# Patient Record
Sex: FEMALE | Race: WHITE | Employment: FULL TIME | URBAN - METROPOLITAN AREA
[De-identification: names, ages, dates, MRNs, and addresses within clinical notes are randomized per-mention and may not be internally consistent; named-entity substitution may affect disease eponyms.]

---

## 2020-12-01 ENCOUNTER — APPOINTMENT (OUTPATIENT)
Dept: GENERAL RADIOLOGY | Age: 25
DRG: 314 | End: 2020-12-01
Payer: COMMERCIAL

## 2020-12-01 ENCOUNTER — HOSPITAL ENCOUNTER (INPATIENT)
Age: 25
LOS: 5 days | Discharge: HOME | DRG: 314 | End: 2020-12-07
Attending: STUDENT IN AN ORGANIZED HEALTH CARE EDUCATION/TRAINING PROGRAM | Admitting: STUDENT IN AN ORGANIZED HEALTH CARE EDUCATION/TRAINING PROGRAM
Payer: COMMERCIAL

## 2020-12-01 ENCOUNTER — APPOINTMENT (OUTPATIENT)
Dept: CT IMAGING | Age: 25
DRG: 314 | End: 2020-12-01
Payer: COMMERCIAL

## 2020-12-01 PROBLEM — S82.852A CLOSED TRIMALLEOLAR FRACTURE OF LEFT ANKLE: Status: ACTIVE | Noted: 2020-12-01
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
## 2020-12-01 LAB
ALBUMIN SERPL-MCNC: 4.6 GM/DL (ref 3.4–5)
ALP BLD-CCNC: 53 IU/L (ref 40–128)
ALT SERPL-CCNC: 12 U/L (ref 10–40)
ANION GAP SERPL CALCULATED.3IONS-SCNC: 17 MMOL/L (ref 4–16)
APTT: 28.4 SECONDS (ref 25.1–37.1)
AST SERPL-CCNC: 15 IU/L (ref 15–37)
BASOPHILS ABSOLUTE: 0 K/CU MM
BASOPHILS RELATIVE PERCENT: 0.3 % (ref 0–1)
BILIRUB SERPL-MCNC: 0.2 MG/DL (ref 0–1)
BUN BLDV-MCNC: 7 MG/DL (ref 6–23)
CALCIUM SERPL-MCNC: 9.3 MG/DL (ref 8.3–10.6)
CHLORIDE BLD-SCNC: 104 MMOL/L (ref 99–110)
CO2: 18 MMOL/L (ref 21–32)
CREAT SERPL-MCNC: 0.6 MG/DL (ref 0.6–1.1)
DIFFERENTIAL TYPE: ABNORMAL
EOSINOPHILS ABSOLUTE: 0.2 K/CU MM
EOSINOPHILS RELATIVE PERCENT: 1.2 % (ref 0–3)
GFR AFRICAN AMERICAN: >60 ML/MIN/1.73M2
GFR NON-AFRICAN AMERICAN: >60 ML/MIN/1.73M2
GLUCOSE BLD-MCNC: 88 MG/DL (ref 70–99)
HCG QUALITATIVE: NEGATIVE
HCT VFR BLD CALC: 43.4 % (ref 37–47)
HEMOGLOBIN: 14.4 GM/DL (ref 12.5–16)
IMMATURE NEUTROPHIL %: 0.3 % (ref 0–0.43)
INR BLD: 0.99 INDEX
LYMPHOCYTES ABSOLUTE: 2.1 K/CU MM
LYMPHOCYTES RELATIVE PERCENT: 16.6 % (ref 24–44)
MCH RBC QN AUTO: 31.2 PG (ref 27–31)
MCHC RBC AUTO-ENTMCNC: 33.2 % (ref 32–36)
MCV RBC AUTO: 94.1 FL (ref 78–100)
MONOCYTES ABSOLUTE: 0.6 K/CU MM
MONOCYTES RELATIVE PERCENT: 4.6 % (ref 0–4)
NUCLEATED RBC %: 0 %
PDW BLD-RTO: 12.2 % (ref 11.7–14.9)
PLATELET # BLD: 261 K/CU MM (ref 140–440)
PMV BLD AUTO: 10.5 FL (ref 7.5–11.1)
POTASSIUM SERPL-SCNC: 4.2 MMOL/L (ref 3.5–5.1)
PROTHROMBIN TIME: 12 SECONDS (ref 11.7–14.5)
RBC # BLD: 4.61 M/CU MM (ref 4.2–5.4)
SARS-COV-2, NAAT: NOT DETECTED
SEGMENTED NEUTROPHILS ABSOLUTE COUNT: 9.7 K/CU MM
SEGMENTED NEUTROPHILS RELATIVE PERCENT: 77 % (ref 36–66)
SODIUM BLD-SCNC: 139 MMOL/L (ref 135–145)
SOURCE: NORMAL
TOTAL IMMATURE NEUTOROPHIL: 0.04 K/CU MM
TOTAL NUCLEATED RBC: 0 K/CU MM
TOTAL PROTEIN: 7.4 GM/DL (ref 6.4–8.2)
WBC # BLD: 12.6 K/CU MM (ref 4–10.5)

## 2020-12-01 PROCEDURE — 80053 COMPREHEN METABOLIC PANEL: CPT

## 2020-12-01 PROCEDURE — 73700 CT LOWER EXTREMITY W/O DYE: CPT

## 2020-12-01 PROCEDURE — 84703 CHORIONIC GONADOTROPIN ASSAY: CPT

## 2020-12-01 PROCEDURE — 85025 COMPLETE CBC W/AUTO DIFF WBC: CPT

## 2020-12-01 PROCEDURE — 85610 PROTHROMBIN TIME: CPT

## 2020-12-01 PROCEDURE — 99285 EMERGENCY DEPT VISIT HI MDM: CPT

## 2020-12-01 PROCEDURE — 29515 APPLICATION SHORT LEG SPLINT: CPT

## 2020-12-01 PROCEDURE — 6370000000 HC RX 637 (ALT 250 FOR IP): Performed by: PHYSICIAN ASSISTANT

## 2020-12-01 PROCEDURE — U0002 COVID-19 LAB TEST NON-CDC: HCPCS

## 2020-12-01 PROCEDURE — 73610 X-RAY EXAM OF ANKLE: CPT

## 2020-12-01 PROCEDURE — 99285 EMERGENCY DEPT VISIT HI MDM: CPT | Performed by: ORTHOPAEDIC SURGERY

## 2020-12-01 PROCEDURE — 6360000002 HC RX W HCPCS: Performed by: PHYSICIAN ASSISTANT

## 2020-12-01 PROCEDURE — 85730 THROMBOPLASTIN TIME PARTIAL: CPT

## 2020-12-01 RX ORDER — HEPARIN SODIUM 5000 [USP'U]/ML
5000 INJECTION, SOLUTION INTRAVENOUS; SUBCUTANEOUS EVERY 8 HOURS SCHEDULED
Status: DISCONTINUED | OUTPATIENT
Start: 2020-12-01 | End: 2020-12-02

## 2020-12-01 RX ORDER — SODIUM CHLORIDE 9 MG/ML
INJECTION, SOLUTION INTRAVENOUS CONTINUOUS
Status: DISCONTINUED | OUTPATIENT
Start: 2020-12-01 | End: 2020-12-05

## 2020-12-01 RX ORDER — NICOTINE 21 MG/24HR
1 PATCH, TRANSDERMAL 24 HOURS TRANSDERMAL DAILY
Status: CANCELLED | OUTPATIENT
Start: 2020-12-02

## 2020-12-01 RX ORDER — POLYETHYLENE GLYCOL 3350 17 G/17G
17 POWDER, FOR SOLUTION ORAL DAILY PRN
Status: DISCONTINUED | OUTPATIENT
Start: 2020-12-01 | End: 2020-12-07 | Stop reason: HOSPADM

## 2020-12-01 RX ORDER — HYDROXYZINE PAMOATE 25 MG/1
50 CAPSULE ORAL ONCE
Status: COMPLETED | OUTPATIENT
Start: 2020-12-01 | End: 2020-12-01

## 2020-12-01 RX ORDER — OXYCODONE HYDROCHLORIDE AND ACETAMINOPHEN 5; 325 MG/1; MG/1
1 TABLET ORAL EVERY 4 HOURS PRN
Status: DISCONTINUED | OUTPATIENT
Start: 2020-12-01 | End: 2020-12-07 | Stop reason: HOSPADM

## 2020-12-01 RX ORDER — MORPHINE SULFATE 4 MG/ML
4 INJECTION, SOLUTION INTRAMUSCULAR; INTRAVENOUS ONCE
Status: COMPLETED | OUTPATIENT
Start: 2020-12-01 | End: 2020-12-01

## 2020-12-01 RX ORDER — HYDROCODONE BITARTRATE AND ACETAMINOPHEN 5; 325 MG/1; MG/1
1 TABLET ORAL ONCE
Status: COMPLETED | OUTPATIENT
Start: 2020-12-01 | End: 2020-12-01

## 2020-12-01 RX ORDER — ONDANSETRON 2 MG/ML
4 INJECTION INTRAMUSCULAR; INTRAVENOUS ONCE
Status: COMPLETED | OUTPATIENT
Start: 2020-12-01 | End: 2020-12-01

## 2020-12-01 RX ORDER — ACETAMINOPHEN 325 MG/1
650 TABLET ORAL EVERY 6 HOURS PRN
Status: DISCONTINUED | OUTPATIENT
Start: 2020-12-01 | End: 2020-12-07 | Stop reason: HOSPADM

## 2020-12-01 RX ORDER — PROMETHAZINE HYDROCHLORIDE 25 MG/1
12.5 TABLET ORAL EVERY 6 HOURS PRN
Status: DISCONTINUED | OUTPATIENT
Start: 2020-12-01 | End: 2020-12-07 | Stop reason: HOSPADM

## 2020-12-01 RX ORDER — ACETAMINOPHEN 650 MG/1
650 SUPPOSITORY RECTAL EVERY 6 HOURS PRN
Status: DISCONTINUED | OUTPATIENT
Start: 2020-12-01 | End: 2020-12-07 | Stop reason: HOSPADM

## 2020-12-01 RX ORDER — ONDANSETRON 2 MG/ML
4 INJECTION INTRAMUSCULAR; INTRAVENOUS EVERY 6 HOURS PRN
Status: DISCONTINUED | OUTPATIENT
Start: 2020-12-01 | End: 2020-12-07 | Stop reason: HOSPADM

## 2020-12-01 RX ORDER — KETOROLAC TROMETHAMINE 30 MG/ML
30 INJECTION, SOLUTION INTRAMUSCULAR; INTRAVENOUS EVERY 6 HOURS PRN
Status: DISPENSED | OUTPATIENT
Start: 2020-12-01 | End: 2020-12-06

## 2020-12-01 RX ADMIN — HYDROXYZINE PAMOATE 50 MG: 25 CAPSULE ORAL at 22:16

## 2020-12-01 RX ADMIN — SODIUM CHLORIDE: 9 INJECTION, SOLUTION INTRAVENOUS at 23:07

## 2020-12-01 RX ADMIN — MORPHINE SULFATE 4 MG: 4 INJECTION, SOLUTION INTRAMUSCULAR; INTRAVENOUS at 22:17

## 2020-12-01 RX ADMIN — HYDROCODONE BITARTRATE AND ACETAMINOPHEN 1 TABLET: 5; 325 TABLET ORAL at 19:50

## 2020-12-01 RX ADMIN — ONDANSETRON 4 MG: 2 INJECTION INTRAMUSCULAR; INTRAVENOUS at 22:17

## 2020-12-01 ASSESSMENT — PAIN SCALES - GENERAL
PAINLEVEL_OUTOF10: 10

## 2020-12-01 ASSESSMENT — PAIN DESCRIPTION - ORIENTATION: ORIENTATION: LEFT

## 2020-12-01 ASSESSMENT — PAIN DESCRIPTION - LOCATION: LOCATION: ANKLE

## 2020-12-01 ASSESSMENT — PAIN DESCRIPTION - PAIN TYPE: TYPE: ACUTE PAIN

## 2020-12-02 ENCOUNTER — APPOINTMENT (OUTPATIENT)
Dept: GENERAL RADIOLOGY | Age: 25
DRG: 314 | End: 2020-12-02
Payer: COMMERCIAL

## 2020-12-02 ENCOUNTER — ANESTHESIA EVENT (OUTPATIENT)
Dept: OPERATING ROOM | Age: 25
DRG: 314 | End: 2020-12-02
Payer: COMMERCIAL

## 2020-12-02 ENCOUNTER — ANESTHESIA (OUTPATIENT)
Dept: OPERATING ROOM | Age: 25
DRG: 314 | End: 2020-12-02
Payer: COMMERCIAL

## 2020-12-02 VITALS
TEMPERATURE: 97.7 F | RESPIRATION RATE: 1 BRPM | SYSTOLIC BLOOD PRESSURE: 115 MMHG | DIASTOLIC BLOOD PRESSURE: 57 MMHG | OXYGEN SATURATION: 97 %

## 2020-12-02 LAB
AMPHETAMINES: NEGATIVE
ANION GAP SERPL CALCULATED.3IONS-SCNC: 10 MMOL/L (ref 4–16)
BARBITURATE SCREEN URINE: NEGATIVE
BASOPHILS ABSOLUTE: 0 K/CU MM
BASOPHILS RELATIVE PERCENT: 0.4 % (ref 0–1)
BENZODIAZEPINE SCREEN, URINE: NEGATIVE
BUN BLDV-MCNC: 8 MG/DL (ref 6–23)
CALCIUM SERPL-MCNC: 8.6 MG/DL (ref 8.3–10.6)
CANNABINOID SCREEN URINE: ABNORMAL
CHLORIDE BLD-SCNC: 101 MMOL/L (ref 99–110)
CO2: 23 MMOL/L (ref 21–32)
COCAINE METABOLITE: NEGATIVE
CREAT SERPL-MCNC: 0.8 MG/DL (ref 0.6–1.1)
DIFFERENTIAL TYPE: ABNORMAL
EOSINOPHILS ABSOLUTE: 0.1 K/CU MM
EOSINOPHILS RELATIVE PERCENT: 1 % (ref 0–3)
GFR AFRICAN AMERICAN: >60 ML/MIN/1.73M2
GFR NON-AFRICAN AMERICAN: >60 ML/MIN/1.73M2
GLUCOSE BLD-MCNC: 98 MG/DL (ref 70–99)
HCT VFR BLD CALC: 39.1 % (ref 37–47)
HEMOGLOBIN: 12.7 GM/DL (ref 12.5–16)
IMMATURE NEUTROPHIL %: 0.3 % (ref 0–0.43)
LYMPHOCYTES ABSOLUTE: 2.5 K/CU MM
LYMPHOCYTES RELATIVE PERCENT: 23.9 % (ref 24–44)
MCH RBC QN AUTO: 31.6 PG (ref 27–31)
MCHC RBC AUTO-ENTMCNC: 32.5 % (ref 32–36)
MCV RBC AUTO: 97.3 FL (ref 78–100)
MONOCYTES ABSOLUTE: 0.9 K/CU MM
MONOCYTES RELATIVE PERCENT: 8.4 % (ref 0–4)
NUCLEATED RBC %: 0 %
OPIATES, URINE: ABNORMAL
OXYCODONE: ABNORMAL
PDW BLD-RTO: 12.5 % (ref 11.7–14.9)
PHENCYCLIDINE, URINE: NEGATIVE
PLATELET # BLD: 237 K/CU MM (ref 140–440)
PMV BLD AUTO: 10.9 FL (ref 7.5–11.1)
POTASSIUM SERPL-SCNC: 4 MMOL/L (ref 3.5–5.1)
RBC # BLD: 4.02 M/CU MM (ref 4.2–5.4)
SEGMENTED NEUTROPHILS ABSOLUTE COUNT: 6.9 K/CU MM
SEGMENTED NEUTROPHILS RELATIVE PERCENT: 66 % (ref 36–66)
SODIUM BLD-SCNC: 134 MMOL/L (ref 135–145)
TOTAL IMMATURE NEUTOROPHIL: 0.03 K/CU MM
TOTAL NUCLEATED RBC: 0 K/CU MM
WBC # BLD: 10.4 K/CU MM (ref 4–10.5)

## 2020-12-02 PROCEDURE — 85025 COMPLETE CBC W/AUTO DIFF WBC: CPT

## 2020-12-02 PROCEDURE — 6370000000 HC RX 637 (ALT 250 FOR IP): Performed by: NURSE PRACTITIONER

## 2020-12-02 PROCEDURE — 3700000001 HC ADD 15 MINUTES (ANESTHESIA): Performed by: ORTHOPAEDIC SURGERY

## 2020-12-02 PROCEDURE — 27818 TREATMENT OF ANKLE FRACTURE: CPT | Performed by: ORTHOPAEDIC SURGERY

## 2020-12-02 PROCEDURE — 3600000004 HC SURGERY LEVEL 4 BASE: Performed by: ORTHOPAEDIC SURGERY

## 2020-12-02 PROCEDURE — 3600000014 HC SURGERY LEVEL 4 ADDTL 15MIN: Performed by: ORTHOPAEDIC SURGERY

## 2020-12-02 PROCEDURE — 80307 DRUG TEST PRSMV CHEM ANLYZR: CPT

## 2020-12-02 PROCEDURE — 2580000003 HC RX 258: Performed by: NURSE ANESTHETIST, CERTIFIED REGISTERED

## 2020-12-02 PROCEDURE — 7100000000 HC PACU RECOVERY - FIRST 15 MIN: Performed by: ORTHOPAEDIC SURGERY

## 2020-12-02 PROCEDURE — 2720000010 HC SURG SUPPLY STERILE: Performed by: ORTHOPAEDIC SURGERY

## 2020-12-02 PROCEDURE — 1200000000 HC SEMI PRIVATE

## 2020-12-02 PROCEDURE — 6360000002 HC RX W HCPCS: Performed by: PHYSICIAN ASSISTANT

## 2020-12-02 PROCEDURE — 2500000003 HC RX 250 WO HCPCS: Performed by: NURSE ANESTHETIST, CERTIFIED REGISTERED

## 2020-12-02 PROCEDURE — 6360000002 HC RX W HCPCS: Performed by: NURSE PRACTITIONER

## 2020-12-02 PROCEDURE — 80048 BASIC METABOLIC PNL TOTAL CA: CPT

## 2020-12-02 PROCEDURE — 2580000003 HC RX 258: Performed by: PHYSICIAN ASSISTANT

## 2020-12-02 PROCEDURE — 64445 NJX AA&/STRD SCIATIC NRV IMG: CPT | Performed by: NURSE ANESTHETIST, CERTIFIED REGISTERED

## 2020-12-02 PROCEDURE — 76000 FLUOROSCOPY <1 HR PHYS/QHP: CPT

## 2020-12-02 PROCEDURE — 6360000002 HC RX W HCPCS: Performed by: NURSE ANESTHETIST, CERTIFIED REGISTERED

## 2020-12-02 PROCEDURE — 2780000010 HC IMPLANT OTHER: Performed by: ORTHOPAEDIC SURGERY

## 2020-12-02 PROCEDURE — 7100000001 HC PACU RECOVERY - ADDTL 15 MIN: Performed by: ORTHOPAEDIC SURGERY

## 2020-12-02 PROCEDURE — 20690 APPL UNIPLN UNI EXT FIXJ SYS: CPT | Performed by: ORTHOPAEDIC SURGERY

## 2020-12-02 PROCEDURE — C1713 ANCHOR/SCREW BN/BN,TIS/BN: HCPCS | Performed by: ORTHOPAEDIC SURGERY

## 2020-12-02 PROCEDURE — 2580000003 HC RX 258: Performed by: NURSE PRACTITIONER

## 2020-12-02 PROCEDURE — 6360000002 HC RX W HCPCS: Performed by: ANESTHESIOLOGY

## 2020-12-02 PROCEDURE — 2709999900 HC NON-CHARGEABLE SUPPLY: Performed by: ORTHOPAEDIC SURGERY

## 2020-12-02 PROCEDURE — 2580000003 HC RX 258: Performed by: ORTHOPAEDIC SURGERY

## 2020-12-02 PROCEDURE — 3700000000 HC ANESTHESIA ATTENDED CARE: Performed by: ORTHOPAEDIC SURGERY

## 2020-12-02 PROCEDURE — C9113 INJ PANTOPRAZOLE SODIUM, VIA: HCPCS | Performed by: NURSE PRACTITIONER

## 2020-12-02 PROCEDURE — 6360000002 HC RX W HCPCS: Performed by: ORTHOPAEDIC SURGERY

## 2020-12-02 DEVICE — PIN EXT FIX L250MM DIA5MM S STL W/ CTRL THRD STNMN: Type: IMPLANTABLE DEVICE | Site: ANKLE | Status: FUNCTIONAL

## 2020-12-02 RX ORDER — ROPIVACAINE HYDROCHLORIDE 5 MG/ML
INJECTION, SOLUTION EPIDURAL; INFILTRATION; PERINEURAL
Status: COMPLETED | OUTPATIENT
Start: 2020-12-02 | End: 2020-12-02

## 2020-12-02 RX ORDER — LIDOCAINE HYDROCHLORIDE 20 MG/ML
INJECTION, SOLUTION INTRAVENOUS PRN
Status: DISCONTINUED | OUTPATIENT
Start: 2020-12-02 | End: 2020-12-02 | Stop reason: SDUPTHER

## 2020-12-02 RX ORDER — GLYCOPYRROLATE 1 MG/5 ML
SYRINGE (ML) INTRAVENOUS PRN
Status: DISCONTINUED | OUTPATIENT
Start: 2020-12-02 | End: 2020-12-02 | Stop reason: SDUPTHER

## 2020-12-02 RX ORDER — NEOSTIGMINE METHYLSULFATE 5 MG/5 ML
SYRINGE (ML) INTRAVENOUS PRN
Status: DISCONTINUED | OUTPATIENT
Start: 2020-12-02 | End: 2020-12-02 | Stop reason: SDUPTHER

## 2020-12-02 RX ORDER — DEXAMETHASONE SODIUM PHOSPHATE 4 MG/ML
INJECTION, SOLUTION INTRA-ARTICULAR; INTRALESIONAL; INTRAMUSCULAR; INTRAVENOUS; SOFT TISSUE PRN
Status: DISCONTINUED | OUTPATIENT
Start: 2020-12-02 | End: 2020-12-02 | Stop reason: SDUPTHER

## 2020-12-02 RX ORDER — SUCCINYLCHOLINE/SOD CL,ISO/PF 100 MG/5ML
SYRINGE (ML) INTRAVENOUS PRN
Status: DISCONTINUED | OUTPATIENT
Start: 2020-12-02 | End: 2020-12-02 | Stop reason: SDUPTHER

## 2020-12-02 RX ORDER — PROPOFOL 10 MG/ML
INJECTION, EMULSION INTRAVENOUS PRN
Status: DISCONTINUED | OUTPATIENT
Start: 2020-12-02 | End: 2020-12-02 | Stop reason: SDUPTHER

## 2020-12-02 RX ORDER — SODIUM CHLORIDE, SODIUM LACTATE, POTASSIUM CHLORIDE, CALCIUM CHLORIDE 600; 310; 30; 20 MG/100ML; MG/100ML; MG/100ML; MG/100ML
INJECTION, SOLUTION INTRAVENOUS CONTINUOUS PRN
Status: DISCONTINUED | OUTPATIENT
Start: 2020-12-02 | End: 2020-12-02 | Stop reason: SDUPTHER

## 2020-12-02 RX ORDER — FENTANYL CITRATE 50 UG/ML
25 INJECTION, SOLUTION INTRAMUSCULAR; INTRAVENOUS EVERY 5 MIN PRN
Status: DISCONTINUED | OUTPATIENT
Start: 2020-12-02 | End: 2020-12-02 | Stop reason: HOSPADM

## 2020-12-02 RX ORDER — HYDROMORPHONE HCL 110MG/55ML
PATIENT CONTROLLED ANALGESIA SYRINGE INTRAVENOUS PRN
Status: DISCONTINUED | OUTPATIENT
Start: 2020-12-02 | End: 2020-12-02 | Stop reason: SDUPTHER

## 2020-12-02 RX ORDER — ONDANSETRON 2 MG/ML
INJECTION INTRAMUSCULAR; INTRAVENOUS PRN
Status: DISCONTINUED | OUTPATIENT
Start: 2020-12-02 | End: 2020-12-02 | Stop reason: SDUPTHER

## 2020-12-02 RX ORDER — SODIUM CHLORIDE 0.9 % (FLUSH) 0.9 %
10 SYRINGE (ML) INJECTION EVERY 12 HOURS SCHEDULED
Status: DISCONTINUED | OUTPATIENT
Start: 2020-12-02 | End: 2020-12-07 | Stop reason: HOSPADM

## 2020-12-02 RX ORDER — NICOTINE 21 MG/24HR
1 PATCH, TRANSDERMAL 24 HOURS TRANSDERMAL DAILY
Status: DISCONTINUED | OUTPATIENT
Start: 2020-12-02 | End: 2020-12-07 | Stop reason: HOSPADM

## 2020-12-02 RX ORDER — SODIUM CHLORIDE 0.9 % (FLUSH) 0.9 %
10 SYRINGE (ML) INJECTION PRN
Status: DISCONTINUED | OUTPATIENT
Start: 2020-12-02 | End: 2020-12-07 | Stop reason: HOSPADM

## 2020-12-02 RX ORDER — MIDAZOLAM HYDROCHLORIDE 1 MG/ML
INJECTION INTRAMUSCULAR; INTRAVENOUS PRN
Status: DISCONTINUED | OUTPATIENT
Start: 2020-12-02 | End: 2020-12-02 | Stop reason: SDUPTHER

## 2020-12-02 RX ORDER — ONDANSETRON 2 MG/ML
4 INJECTION INTRAMUSCULAR; INTRAVENOUS
Status: DISCONTINUED | OUTPATIENT
Start: 2020-12-02 | End: 2020-12-02 | Stop reason: HOSPADM

## 2020-12-02 RX ORDER — HYDRALAZINE HYDROCHLORIDE 20 MG/ML
5 INJECTION INTRAMUSCULAR; INTRAVENOUS EVERY 10 MIN PRN
Status: DISCONTINUED | OUTPATIENT
Start: 2020-12-02 | End: 2020-12-02 | Stop reason: HOSPADM

## 2020-12-02 RX ORDER — ROCURONIUM BROMIDE 10 MG/ML
INJECTION, SOLUTION INTRAVENOUS PRN
Status: DISCONTINUED | OUTPATIENT
Start: 2020-12-02 | End: 2020-12-02 | Stop reason: SDUPTHER

## 2020-12-02 RX ORDER — FENTANYL CITRATE 50 UG/ML
50 INJECTION, SOLUTION INTRAMUSCULAR; INTRAVENOUS EVERY 5 MIN PRN
Status: DISCONTINUED | OUTPATIENT
Start: 2020-12-02 | End: 2020-12-02 | Stop reason: HOSPADM

## 2020-12-02 RX ORDER — LABETALOL HYDROCHLORIDE 5 MG/ML
5 INJECTION, SOLUTION INTRAVENOUS EVERY 10 MIN PRN
Status: DISCONTINUED | OUTPATIENT
Start: 2020-12-02 | End: 2020-12-02 | Stop reason: HOSPADM

## 2020-12-02 RX ORDER — PANTOPRAZOLE SODIUM 40 MG/10ML
40 INJECTION, POWDER, LYOPHILIZED, FOR SOLUTION INTRAVENOUS DAILY
Status: DISCONTINUED | OUTPATIENT
Start: 2020-12-02 | End: 2020-12-07 | Stop reason: HOSPADM

## 2020-12-02 RX ORDER — FENTANYL CITRATE 50 UG/ML
INJECTION, SOLUTION INTRAMUSCULAR; INTRAVENOUS PRN
Status: DISCONTINUED | OUTPATIENT
Start: 2020-12-02 | End: 2020-12-02 | Stop reason: SDUPTHER

## 2020-12-02 RX ADMIN — HYDROMORPHONE HYDROCHLORIDE 0.5 MG: 2 INJECTION INTRAMUSCULAR; INTRAVENOUS; SUBCUTANEOUS at 09:13

## 2020-12-02 RX ADMIN — HYDROMORPHONE HYDROCHLORIDE 0.5 MG: 2 INJECTION INTRAMUSCULAR; INTRAVENOUS; SUBCUTANEOUS at 08:53

## 2020-12-02 RX ADMIN — OXYCODONE HYDROCHLORIDE AND ACETAMINOPHEN 1 TABLET: 5; 325 TABLET ORAL at 21:28

## 2020-12-02 RX ADMIN — PANTOPRAZOLE SODIUM 40 MG: 40 INJECTION, POWDER, LYOPHILIZED, FOR SOLUTION INTRAVENOUS at 10:30

## 2020-12-02 RX ADMIN — ROPIVACAINE HYDROCHLORIDE 30 ML: 5 INJECTION, SOLUTION EPIDURAL; INFILTRATION; PERINEURAL at 08:44

## 2020-12-02 RX ADMIN — FENTANYL CITRATE 100 MCG: 50 INJECTION INTRAMUSCULAR; INTRAVENOUS at 07:58

## 2020-12-02 RX ADMIN — Medication 3 MG: at 09:43

## 2020-12-02 RX ADMIN — OXYCODONE HYDROCHLORIDE AND ACETAMINOPHEN 1 TABLET: 5; 325 TABLET ORAL at 11:31

## 2020-12-02 RX ADMIN — OXYCODONE HYDROCHLORIDE AND ACETAMINOPHEN 1 TABLET: 5; 325 TABLET ORAL at 16:40

## 2020-12-02 RX ADMIN — OXYCODONE HYDROCHLORIDE AND ACETAMINOPHEN 1 TABLET: 5; 325 TABLET ORAL at 04:58

## 2020-12-02 RX ADMIN — Medication 140 MG: at 08:45

## 2020-12-02 RX ADMIN — PROPOFOL 200 MG: 10 INJECTION, EMULSION INTRAVENOUS at 08:45

## 2020-12-02 RX ADMIN — CEFAZOLIN SODIUM 2 G: 10 INJECTION, POWDER, FOR SOLUTION INTRAVENOUS at 15:17

## 2020-12-02 RX ADMIN — DEXAMETHASONE SODIUM PHOSPHATE 8 MG: 4 INJECTION, SOLUTION INTRAMUSCULAR; INTRAVENOUS at 08:50

## 2020-12-02 RX ADMIN — ONDANSETRON 4 MG: 2 INJECTION INTRAMUSCULAR; INTRAVENOUS at 08:50

## 2020-12-02 RX ADMIN — OXYCODONE HYDROCHLORIDE AND ACETAMINOPHEN 1 TABLET: 5; 325 TABLET ORAL at 00:21

## 2020-12-02 RX ADMIN — SODIUM CHLORIDE, PRESERVATIVE FREE 10 ML: 5 INJECTION INTRAVENOUS at 20:13

## 2020-12-02 RX ADMIN — CEFAZOLIN SODIUM 2 G: 10 INJECTION, POWDER, FOR SOLUTION INTRAVENOUS at 23:07

## 2020-12-02 RX ADMIN — ROCURONIUM BROMIDE 50 MG: 10 INJECTION INTRAVENOUS at 08:50

## 2020-12-02 RX ADMIN — LIDOCAINE HYDROCHLORIDE 100 MG: 20 INJECTION, SOLUTION INTRAVENOUS at 08:45

## 2020-12-02 RX ADMIN — SODIUM CHLORIDE, POTASSIUM CHLORIDE, SODIUM LACTATE AND CALCIUM CHLORIDE: 600; 310; 30; 20 INJECTION, SOLUTION INTRAVENOUS at 08:39

## 2020-12-02 RX ADMIN — SODIUM CHLORIDE: 9 INJECTION, SOLUTION INTRAVENOUS at 11:19

## 2020-12-02 RX ADMIN — CEFAZOLIN 1 G: 1 INJECTION, POWDER, FOR SOLUTION INTRAMUSCULAR; INTRAVENOUS; PARENTERAL at 08:50

## 2020-12-02 RX ADMIN — SODIUM CHLORIDE, PRESERVATIVE FREE 10 ML: 5 INJECTION INTRAVENOUS at 15:17

## 2020-12-02 RX ADMIN — MIDAZOLAM 2 MG: 1 INJECTION INTRAMUSCULAR; INTRAVENOUS at 08:30

## 2020-12-02 RX ADMIN — Medication 0.4 MG: at 09:43

## 2020-12-02 ASSESSMENT — PULMONARY FUNCTION TESTS
PIF_VALUE: 19
PIF_VALUE: 18
PIF_VALUE: 2
PIF_VALUE: 19
PIF_VALUE: 18
PIF_VALUE: 19
PIF_VALUE: 17
PIF_VALUE: 18
PIF_VALUE: 20
PIF_VALUE: 18
PIF_VALUE: 20
PIF_VALUE: 0
PIF_VALUE: 18
PIF_VALUE: 17
PIF_VALUE: 19
PIF_VALUE: 18
PIF_VALUE: 26
PIF_VALUE: 17
PIF_VALUE: 19
PIF_VALUE: 18
PIF_VALUE: 20
PIF_VALUE: 17
PIF_VALUE: 17
PIF_VALUE: 2
PIF_VALUE: 20
PIF_VALUE: 18
PIF_VALUE: 0
PIF_VALUE: 2
PIF_VALUE: 18
PIF_VALUE: 34
PIF_VALUE: 20
PIF_VALUE: 20
PIF_VALUE: 11
PIF_VALUE: 19
PIF_VALUE: 2
PIF_VALUE: 18
PIF_VALUE: 10
PIF_VALUE: 19
PIF_VALUE: 20
PIF_VALUE: 4
PIF_VALUE: 17
PIF_VALUE: 24
PIF_VALUE: 18
PIF_VALUE: 18
PIF_VALUE: 20
PIF_VALUE: 20
PIF_VALUE: 19
PIF_VALUE: 1
PIF_VALUE: 20
PIF_VALUE: 0
PIF_VALUE: 17
PIF_VALUE: 17
PIF_VALUE: 18
PIF_VALUE: 19
PIF_VALUE: 20
PIF_VALUE: 20
PIF_VALUE: 18
PIF_VALUE: 2
PIF_VALUE: 1
PIF_VALUE: 20

## 2020-12-02 ASSESSMENT — PAIN DESCRIPTION - ORIENTATION
ORIENTATION: LEFT
ORIENTATION: LEFT

## 2020-12-02 ASSESSMENT — PAIN SCALES - GENERAL
PAINLEVEL_OUTOF10: 5
PAINLEVEL_OUTOF10: 4
PAINLEVEL_OUTOF10: 0
PAINLEVEL_OUTOF10: 5
PAINLEVEL_OUTOF10: 4
PAINLEVEL_OUTOF10: 5
PAINLEVEL_OUTOF10: 10
PAINLEVEL_OUTOF10: 2
PAINLEVEL_OUTOF10: 10

## 2020-12-02 ASSESSMENT — PAIN DESCRIPTION - PAIN TYPE
TYPE: SURGICAL PAIN
TYPE: SURGICAL PAIN

## 2020-12-02 NOTE — PROGRESS NOTES
Hospitalist Progress Note      Name:  David Montiel /Age/Sex: 1995  (22 y.o. female)   MRN & CSN:  [id number] & [id number] Admission Date/Time: 2020  7:17 PM   Location:  [address] [place] [address]-A PCP: [facility]  Day: 2    Assessment and Plan:   David Montiel is a 22 y.o.  female  who presents with Closed trimalleolar fracture of left ankle    · Closed Left trimalleolar ankle fracture s/p closed reduction, external fixation 2020  -S/P  fall  -On IV ancef,followed by daily keflex  -NWB  -Pain control, neuro checks  -Orthopedic surgery,plan for staged surgery following resolution of soft tissue     Alcohol use - admits to drinking alcohol       -denied withdrawal symptoms, monitor for symptoms      Tobacco abuse - on Nicotine patch, tobacco cessation education.      Morbid obesity - current BMI 33.09       -Health maintenance: exercise, lifestyle modification, and reduced caloric intake            Chronic Illnesses:        -asthma - breathing stable on room air, no resp distress noted. -bipolar 1 disorder    Diet DIET GENERAL;   DVT Prophylaxis [] Lovenox, []  Heparin, [] SCDs, []No VTE prophylaxis, patient ambulating   GI Prophylaxis [] PPI, [] H2 Blocker, [] No GI prophylaxis, patient is receiving diet/Tube Feeds   Code Status Full Code   Disposition Patient requires continued admission due to    MDM [] Low, [] Moderate,[]  High  Patient's risk as above due to      History of Present Illness:     Pt S&E. No acute events, seen after surgery, doing good, has pain but improves with medications     10-14 point ROS reviewed negative, unless as noted above    Objective:        Intake/Output Summary (Last 24 hours) at 2020 1338  Last data filed at 2020 1040  Gross per 24 hour   Intake 600 ml   Output 50 ml   Net 550 ml      Vitals:   Vitals:    20 1105   BP: 126/60   Pulse: 73   Resp: 15   Temp: 97.4 °F (36.3 °C)   SpO2: 95% Physical Exam:   GEN Awake female, sitting upright in bed in no apparent distress. Appears given age. EYES Pupils are equally round. No scleral erythema, discharge, or conjunctivitis. HENT Mucous membranes are moist.   NECK No apparent thyromegaly or masses. RESP Clear to auscultation, no wheezes, rales or rhonchi. Symmetric chest movement while on room air. CARDIO/VASC S1/S2 auscultated. Regular rate without appreciable murmurs, rubs, or gallops. Peripheral pulses equal bilaterally and palpable. No peripheral edema. GI Abdomen is soft without significant tenderness, masses, or guarding. Bowel sounds are normoactive. Rectal exam deferred.  Luciano catheter is not present. HEME/LYMPH No petechiae or ecchymoses. MSK No gross joint deformities. Spontaneous movement of all extremities. Left ankle with fracture blisters, external fixator in place  SKIN Normal coloration, warm, dry. NEURO Cranial nerves appear grossly intact, normal speech, no lateralizing weakness. PSYCH Awake, alert, oriented x 4. Affect appropriate.     Medications:   Medications:    sodium chloride flush  10 mL Intravenous 2 times per day    pantoprazole  40 mg Intravenous Daily    nicotine  1 patch Transdermal Daily    [START ON 12/3/2020] enoxaparin  40 mg Subcutaneous Daily    ceFAZolin  2 g Intravenous Q8H      Infusions:    sodium chloride 75 mL/hr at 12/02/20 1119     PRN Meds: sodium chloride flush, 10 mL, PRN  acetaminophen, 650 mg, Q6H PRN    Or  acetaminophen, 650 mg, Q6H PRN  polyethylene glycol, 17 g, Daily PRN  promethazine, 12.5 mg, Q6H PRN    Or  ondansetron, 4 mg, Q6H PRN  oxyCODONE-acetaminophen, 1 tablet, Q4H PRN  ketorolac, 30 mg, Q6H PRN        Data    Recent Labs     12/01/20 2118 12/02/20  0542   WBC 12.6* 10.4   HGB 14.4 12.7   HCT 43.4 39.1    237      Recent Labs     12/01/20 2118 12/02/20  0542    134*   K 4.2 4.0    101   CO2 18* 23   BUN 7 8   CREATININE 0.6 0.8     Recent Labs 12/01/20 2118   AST 15   ALT 12   BILITOT 0.2   ALKPHOS 53     Recent Labs     12/01/20 2118   INR 0.99     No results for input(s): CKTOTAL, CKMB, CKMBINDEX, TROPONINI in the last 72 hours.         Electronically signed by Miles Serrano MD on 12/2/2020 at 1:38 PM

## 2020-12-02 NOTE — PROGRESS NOTES
1946: Arrived to PACU from OR. Monitors applied, alarms on. Report obtained from Sabra Gonzalez and Alan Hendrix CRNA. External fixator in place left ankle secured with 3 pins. Bruising and multiple blisters noted medial aspect left ankle. Able to wiggle toes and states left lower leg feels numb ( had preop block). 1030: Dozes, arouses easily to name. States that's the best sleep shes had in a very long time. 1040: Attempted to call mother but states is not accepting calls at this time. 1042: Transported to room 1120.  1055: Mother called in and update given. New telephone number for her placed in chart. Stated the number I had was her old number.

## 2020-12-02 NOTE — ED NOTES
Bed: ED-37  Expected date:   Expected time:   Means of arrival:   Comments:   1032 Tyrell Snowden RN  12/02/20 8224

## 2020-12-02 NOTE — H&P
History and Physical  [facility]      Name:  Rosezetta Cowden /Age/Sex: 1995  (22 y.o. female)   MRN & CSN:  [id number] & [id number] Admission Date/Time: 2020  7:17 PM   Location:  ED05/ED-05 PCP: Binu JIMENEZ [facility] Day: 1      Supervising Physician: Dr. Eden Perry     Chief Complaint: Ankle Injury (left)     Assessment and Plan:   Rosezetta Cowden is a 22 y.o. female who presents with Closed trimalleolar fracture of left ankle     Closed trimalleolar fracture of left ankle, r/t mechanical fall - as per xray   Fall at home, initial encounter - fell last night.  Leukocytosis (12.6) - no clear evidence of infection at this time, no fever.       -admit inpatient    -Orthopedic Surgery, Dr. Mary Bennett consulted in ED    -NPO after MN for possible ORIF left ankle tomorrow    -c/w MIVF    -bedrest w/ BSC    -applied ICE to relieve pain and swelling    -non weight bearing, elevate affected extremity    -Neurovascular checks    -pain control: prn percocet, Toradol    -FALL precautions    -PT/OT eval and treat    -check lab works in AM    -pending UDS, rapid COVID, CT left ankle     Alcohol use - admits to drinking alcohol, last intake was 2 beers last night.       -denied withdrawal symptoms       -consider CIWA protocol if become symptomatic for withdrawal     Tobacco abuse - on Nicotine patch, tobacco cessation education.  Morbid obesity - current BMI 33.09       -Health maintenance: exercise, lifestyle modification, and reduced caloric intake            Chronic Illnesses:        -asthma - breathing stable on room air, no resp distress noted. -bipolar 1 disorder     Current diagnosis and plan of management discussed with the patient at the time of admission in lay language who agree to the above plan and disposition of admission for further care. All concerns and questions addressed.       Patient assessment and plan in conjunction with supervising physician - Joann Russell     Diet  General, n.p.o. after MN   DVT Prophylaxis [] Lovenox, [x]  Heparin, [] SCDs, [] Ambulation  [] Long term AC   GI Prophylaxis [x] PPI,  [] H2 Blocker,  [] Carafate,  [] Diet,  [] No GI prophylaxis, N/A: patient is not under significant medical stress, non-ICU or is receiving a diet/tube feeds   Code Status  Full CODE   Disposition Patient requires continued admission due to Closed trimalleolar fracture of left ankle. Discharge Plan: Patient plans to return home upon discharge. MDM [] Low, [] Moderate,[x]  High  Patient's risk as above due to:      [x] One or more chronic illnesses with mild exacerbation progression      [] Two or more stable chronic illnesses      [] Undiagnosed new problem with uncertain prognosis      [] Elective major surgery      []Prescription drug management     History of Present Illness:     Principal Problem: Closed trimalleolar fracture of left ankle  Sakina Carrera is a 22 y.o. female with past medical history of obesity, asthma, bipolar 1 disorder, alcohol use, and tobacco abuse presented to the ED with complaints of left ankle swelling and pain related to fall. Patient reports that she went out to empty the trash can when she slipped outside, fell, and injured her left ankle. Stated that she was able to tolerate the pain after the fall but got worse, and aggravated by ambulation. Patient admits to drinking alcohol, last intake was 2 beers last night. Patient denies chest pain, palpitation, fever, chills or diaphoresis, cough, and SOB or difficulty breathing. Denies any other symptoms including headache, paresthesias, abdominal pain, nausea, vomiting, changes in bowels, dysuria, hematuria, frequency or urgency, and B/L weakness. Rapid COVID19 testing in ED for possible surgery tomorrow, still pending. Upon interview, the patient provided the history as above.       ED Course: Discussed case with ED physician prior to admission. ROS: Ten point ROS reviewed and negative, unless as noted above per HPI. Objective:   No intake or output data in the 24 hours ending 12/01/20 2155     Vitals:   Vitals:    12/01/20 1915 12/01/20 1919   BP:  120/66   Pulse: 79    Resp: 16    Temp: 95.5 °F (35.3 °C)    TempSrc: Oral    SpO2: 100%    Weight: 205 lb (93 kg)    Height: 5' 6\" (1.676 m)      Physical Exam: 12/01/20    GEN  -Awake, alert, appearing morbidly obese female, sitting upright in bed, cooperative, able to give adequate history. Appears given age. EYES -Pupils are equally round. No vision changes. No scleral erythema, discharge, or conjunctivitis. HENT -MM are moist. Oral pharynx without exudates, no evidence of thrush. NECK -Supple, no apparent thyromegaly or masses. RESP -LS CTA equal bilat, no wheezes, rales or rhonchi. Symmetric chest movement. No respiratory distress noted. C/V  -S1/S2 auscultated. RRR without appreciable M/R/G. No JVD or carotid bruits. Peripheral pulses equal bilaterally and palpable. Peripheral pulses equal bilaterally and palpable. No peripheral edema. GI  -central obesity, abdomen is soft, round, non-distended, no significant tenderness. No masses or guarding. + BS in all quadrants. Rectal exam deferred.   -Luciano catheter is not present. LYMPH  -No palpable cervical lymphadenopathy and no hepatosplenomegaly. No petechiae or ecchymoses. MS  -B/L UE and right LE strong muscles strength. Full movements. No gross joint deformities. No swelling, intact sensation symmetrical.  Left LE able to move but left ankle painful to move with limited ROM. SKIN  -Normal coloration, warm, dry. No open wounds or ulcers. NEURO  -CN 2-12 appear grossly intact, normal speech, no lateralizing weakness. PSYC  -Awake, alert, oriented x 4. Appropriate affect.      Past Medical History:      Past Medical History:   Diagnosis Date    Asthma     Bipolar 1 disorder ([address]     Depression  Pregnancy      premature rupture of membranes (PPROM) delivered, current hospitalization 2014     uterine contractions, antepartum 2014     Past Surgery History:  Patient  has no past surgical history on file. Social History:    FAM HX: Assessed: family history includes Asthma in her mother. Soc HX:   Social History     Socioeconomic History    Marital status: Single     Spouse name: None    Number of children: None    Years of education: None    Highest education level: None   Occupational History    None   Social Needs    Financial resource strain: None    Food insecurity     Worry: None     Inability: None    Transportation needs     Medical: None     Non-medical: None   Tobacco Use    Smoking status: Light Tobacco Smoker    Smokeless tobacco: Never Used   Substance and Sexual Activity    Alcohol use: No    Drug use: No    Sexual activity: Yes   Lifestyle    Physical activity     Days per week: None     Minutes per session: None    Stress: None   Relationships    Social connections     Talks on phone: None     Gets together: None     Attends Gnosticist service: None     Active member of club or organization: None     Attends meetings of clubs or organizations: None     Relationship status: None    Intimate partner violence     Fear of current or ex partner: None     Emotionally abused: None     Physically abused: None     Forced sexual activity: None   Other Topics Concern    None   Social History Narrative    None     TOBACCO:   reports that she has been smoking. She has never used smokeless tobacco.  ETOH:   reports no history of alcohol use. Drugs:  reports no history of drug use. Allergies:    Allergies   Allergen Reactions    Aspirin Anaphylaxis    Ibuprofen Swelling     Medications:   Medications:    hydrOXYzine  50 mg Oral Once    morphine  4 mg Intravenous Once    ondansetron  4 mg Intravenous Once    [START ON 2020] ceFAZolin (ANCEF) 1 g in dextrose 5 % 50 mL IVPB (mini-bag)  1 g Intravenous On Call to OR      Infusions:   PRN Meds:    Prior to Admission Meds:  Prior to Admission medications    Medication Sig Start Date End Date Taking? Authorizing Provider   ondansetron (ZOFRAN ODT) 4 MG disintegrating tablet Take 1 tablet by mouth every 6 hours 18   Hong Larson MD   dicyclomine (BENTYL) 10 MG capsule Take 1 capsule by mouth 3 times daily as needed (cramping) 18   Hong Larson MD   cyclobenzaprine (FLEXERIL) 10 MG tablet Take 10 mg by mouth 3 times daily as needed for Muscle spasms    Historical Provider, MD   ipratropium-albuterol (DUONEB) 0.5-2.5 (3) MG/3ML SOLN nebulizer solution Inhale 1 vial into the lungs every 4 hours as needed for Shortness of Breath. Historical Provider, MD   Prenatal Multivit-Min-Fe-FA (PRE- FORMULA PO) Take 1 tablet by mouth daily. Historical Provider, MD     Data:     Laboratory this visit:  Reviewed  Recent Labs     20   WBC 12.6*   HGB 14.4   HCT 43.4         Recent Labs     20      K 4.2      CO2 18*   BUN 7   CREATININE 0.6     Recent Labs     20   AST 15   ALT 12   BILITOT 0.2   ALKPHOS 53     Recent Labs     20   INR 0.99     No results for input(s): CKTOTAL, CKMB, CKMBINDEX in the last 72 hours. Invalid input(s): Moi Speaker input(s): PRO-BNP    Radiology this visit:  Reviewed. Xr Ankle Left (min 3 Views)    Result Date: 2020  EXAMINATION: THREE XRAY VIEWS OF THE LEFT ANKLE 2020 6:34 pm COMPARISON: None. HISTORY: ORDERING SYSTEM PROVIDED HISTORY: injury TECHNOLOGIST PROVIDED HISTORY: Reason for exam:->injury Reason for Exam: left ankle pain Acuity: Acute Type of Exam: Initial Mechanism of Injury: fall Relevant Medical/Surgical History: na FINDINGS: Soft tissue thickening was noted over each malleolus. A tri malleolar fracture of the left ankle was noted.   The fracture of the left distal fibular metaphysis and epiphysis was comminuted. No measurable displacement or distraction was noted. The left medial malleolar fracture was comminuted with 2-3 mm of distraction. A posterior malleolar fracture was noted with 4 mm of distraction. The left distal tibia is slightly angled and displaced anteriorly. Medial widening of the left ankle mortise joint was noted. Trimalleolar fracture of the left ankle as described above. EKG this visit:   EKG: No available ECG to review during assessment/interview. Please see ED notes.     Current Treatment Team:  Treatment Team: Attending Provider: Juan R Petersen DO; Physician Assistant: Tamera Franco PA-C; Registered Nurse: MAYKEL Swift APRN-BC [facility]  12/1/2020 9:55 PM      Electronically signed by ALEK Joya CNP on 12/1/2020 at 9:55 PM

## 2020-12-02 NOTE — ED PROVIDER NOTES
EMERGENCY DEPARTMENT ENCOUNTER        PCP: Jonny Nugent    CHIEF COMPLAINT    Chief Complaint   Patient presents with    Ankle Injury     left       This patient was not evaluated by the attending physician. I have independently evaluated this patient. HPI    Addi Steele is a 22 y.o. female who presents with pain localized in the Left ankle. Onset was prior to arrival. The context is patient states she slipped outside injuring left ankle. The pain severity is moderate. The patient has no associated other injuries. The pain is aggravated by ambulation and direct palpation. Patient denies pregnancy. REVIEW OF SYSTEMS    General: No fever or chills  Skin: No lacerations or puncture wounds  Musculoskeletal: Complains of ankle pain, no other joint pain    All other review of systems are negative  See HPI and nursing notes for additional information     PAST MEDICAL & SURGICAL HISTORY    Past Medical History:   Diagnosis Date    Asthma     Bipolar 1 disorder ([facility] [address]     Depression     Pregnancy      premature rupture of membranes (PPROM) delivered, current hospitalization 2014     uterine contractions, antepartum 2014     History reviewed. No pertinent surgical history. CURRENT MEDICATIONS    Current Outpatient Rx   Medication Sig Dispense Refill    ondansetron (ZOFRAN ODT) 4 MG disintegrating tablet Take 1 tablet by mouth every 6 hours 10 tablet 0    dicyclomine (BENTYL) 10 MG capsule Take 1 capsule by mouth 3 times daily as needed (cramping) 30 capsule 0    cyclobenzaprine (FLEXERIL) 10 MG tablet Take 10 mg by mouth 3 times daily as needed for Muscle spasms      ipratropium-albuterol (DUONEB) 0.5-2.5 (3) MG/3ML SOLN nebulizer solution Inhale 1 vial into the lungs every 4 hours as needed for Shortness of Breath.  Prenatal Multivit-Min-Fe-FA (PRE- FORMULA PO) Take 1 tablet by mouth daily.          ALLERGIES    Allergies   Allergen Reactions    Aspirin Anaphylaxis    Ibuprofen Swelling       SOCIAL & FAMILY HISTORY    Social History     Socioeconomic History    Marital status: Single     Spouse name: None    Number of children: None    Years of education: None    Highest education level: None   Occupational History    None   Social Needs    Financial resource strain: None    Food insecurity     Worry: None     Inability: None    Transportation needs     Medical: None     Non-medical: None   Tobacco Use    Smoking status: Light Tobacco Smoker    Smokeless tobacco: Never Used   Substance and Sexual Activity    Alcohol use: No    Drug use: No    Sexual activity: Yes   Lifestyle    Physical activity     Days per week: None     Minutes per session: None    Stress: None   Relationships    Social connections     Talks on phone: None     Gets together: None     Attends Confucianist service: None     Active member of club or organization: None     Attends meetings of clubs or organizations: None     Relationship status: None    Intimate partner violence     Fear of current or ex partner: None     Emotionally abused: None     Physically abused: None     Forced sexual activity: None   Other Topics Concern    None   Social History Narrative    None     Family History   Problem Relation Age of Onset    Asthma Mother          PHYSICAL EXAM    VITAL SIGNS: /66   Pulse 79   Temp 95.5 °F (35.3 °C) (Oral)   Resp 16   Ht 5' 6\" (1.676 m)   Wt 205 lb (93 kg)   SpO2 100%   BMI 33.09 kg/m²   Constitutional:  Well developed, appears comfortable  HENT:  Atraumatic  Respiratory:  Nonlabored breathing  Musculoskeletal: The Left  ankle demonstrates generalized soft tissue swelling. There is generalized tenderness to ankle. Range of motion difficult to assess due to pain - no obvious deficits. Achilles tendon clinically intact.    No swelling, discoloration, or tenderness to palpation of the proximal to distal lower leg bones,  foot bones/toes  Vascular:  DP pulse 2+, capillary refill intact. Integument:  No open wounds. Neurologic:  Awake alert, no slurred speech     RADIOLOGY   XR ANKLE LEFT (MIN 3 VIEWS)   Final Result   Trimalleolar fracture of the left ankle as described above. CT ANKLE LEFT WO CONTRAST    (Results Pending)           PROCEDURES   SPLINT PLACEMENT     A posterior short leg with stirrup splint was applied by myself and emergency department technician. The splint is in good position. The patient's extremity is neurovascularly intact after the splint placement. ED COURSE & MEDICAL DECISION MAKING      Patient presents as above. Patient provided ice pack and Norco.  Left ankle x-ray shows trimalleolar fracture left ankle. Consult to orthopedist Dr. Ani Siegel who requests CT of left ankle without contrast, rapid Covid, preop lab work and admission to medicine. Patient placed in posterior short leg with stirrup. Patient states she is feeling anxious and is provided Vistaril. Consult hospitalist who accepts admission. Clinical  IMPRESSION    1. Closed trimalleolar fracture of left ankle, initial encounter        Patient admitted    Comment: Please note this report has been produced using speech recognition software and may contain errors related to that system including errors in grammar, punctuation, and spelling, as well as words and phrases that may be inappropriate. If there are any questions or concerns please feel free to contact the dictating provider for clarification.         Jenn Lunsford PA-C  12/01/20 0607

## 2020-12-02 NOTE — BRIEF OP NOTE
Brief Postoperative Note      Patient: Jose Grimes  YOB: 1995  MRN: [id number]    Date of Procedure: 12/2/2020    Pre-Op Diagnosis: left trimalleolar ankle fracture    Post-Op Diagnosis: Same       Procedure: Closed reduction left ankle with application of external fixator    Surgeon(s):  Margoth Naik MD    Assistant:  Physician Assistant: Isael Quintana PA-C    Anesthesia: General    Estimated Blood Loss (mL): Minimal    Complications: None    Specimens:   * No specimens in log *    Implants:  Implant Name Type Inv. Item Serial No.  Lot No. LRB No. Used Action   PIN EXT FIX [id number] DIA5MM S STL W/ CTRL THRD STNMN  PIN EXT FIX L250MM DIA5MM S STL W/ CTRL THRD STNMN  [employer]  Left 1 Implanted   5.0 Puja Pin      Left 2 Implanted         Drains: * No LDAs found *    Findings: fracture blisters about ankle. Trimalleolar ankle fracture    Plan:   1) PT/OT, Activity: No weightbearing left lower extremity  2) Ancef x 24 hours. Once completed will need Keflex PO daily for pin care. 3) Daily pin care. 4) Plan for staged surgery once soft tissue improves. 5) DVT prophylaxis: Recommend Lovenox x 3 weeks  6) Follow up with Dr. Atif Ashton in 1 week. Call sooner for question, concerns or increased pain.      Electronically signed by Margoth Naik MD on 12/2/2020 at 3:47 PM

## 2020-12-02 NOTE — CONSULTS
ORTHOPEDIC CONSULT      2020    Patient name: Demetrice Riggs  : 1995    CHIEF COMPLAINT  Left ankle pain    HPI  The patient was seen and examined. Demetrice Riggs is a 22 y.o. female who is admitted for the above chief complaint. Orthopedic service consulted for evaluation of left ankle trimalleolar fracture dislocation  Date of injury/Duration of symptoms: today  Mechanism of injury: fall  Severity: 8/10     Character: constant        PAST MEDICAL HISTORY  Past Medical History:   Diagnosis Date    Asthma     Bipolar 1 disorder ([facility] [address]     Depression     Pregnancy      premature rupture of membranes (PPROM) delivered, current hospitalization 2014     uterine contractions, antepartum 2014       CURRENT MEDICATIONS  Prior to Admission medications    Medication Sig Start Date End Date Taking? Authorizing Provider   ondansetron (ZOFRAN ODT) 4 MG disintegrating tablet Take 1 tablet by mouth every 6 hours 18   Marisela Edwards MD   dicyclomine (BENTYL) 10 MG capsule Take 1 capsule by mouth 3 times daily as needed (cramping) 18   Marisela Edwards MD   cyclobenzaprine (FLEXERIL) 10 MG tablet Take 10 mg by mouth 3 times daily as needed for Muscle spasms    Historical Provider, MD   ipratropium-albuterol (DUONEB) 0.5-2.5 (3) MG/3ML SOLN nebulizer solution Inhale 1 vial into the lungs every 4 hours as needed for Shortness of Breath. Historical Provider, MD   Prenatal Multivit-Min-Fe-FA (PRE- FORMULA PO) Take 1 tablet by mouth daily. Historical Provider, MD       ALLERGIES  Allergies   Allergen Reactions    Aspirin Anaphylaxis    Ibuprofen Swelling       SURGICAL HISTORY  History reviewed. No pertinent surgical history.     FAMILY HISTORY  Family History   Problem Relation Age of Onset    Asthma Mother        SOCIAL HISTORY  Social History     Socioeconomic History    Marital status: Single     Spouse name: None    Number of children: None    Years of education: None    Highest education level: None   Occupational History    None   Social Needs    Financial resource strain: None    Food insecurity     Worry: None     Inability: None    Transportation needs     Medical: None     Non-medical: None   Tobacco Use    Smoking status: Light Tobacco Smoker    Smokeless tobacco: Never Used   Substance and Sexual Activity    Alcohol use: No    Drug use: No    Sexual activity: Yes   Lifestyle    Physical activity     Days per week: None     Minutes per session: None    Stress: None   Relationships    Social connections     Talks on phone: None     Gets together: None     Attends Anabaptism service: None     Active member of club or organization: None     Attends meetings of clubs or organizations: None     Relationship status: None    Intimate partner violence     Fear of current or ex partner: None     Emotionally abused: None     Physically abused: None     Forced sexual activity: None   Other Topics Concern    None   Social History Narrative    None       REVIEW OF SYSTEMS  Comprehensive ROS completed. In specific,  - Constitutional: Denies fevers, chills, fatigue, weakness, unexpected weight loss/gain  - Cardiovascular: Denies chest pain, shortness of breath, palpitation and dyspnea on exertion  - Musculoskeletal: joint pain, joint swelling  - Neuro: Denies numbness, tingling, paresthesias, loss of consciousness, dizziness  - Skin: Denies ulceration, bruising, scars, open wounds    All other systems reviewed and are negative unless otherwise stated above or in the HPI.     PHYSICAL EXAM  VITAL SIGNS: /66   Pulse 79   Temp 95.5 °F (35.3 °C) (Oral)   Resp 16   Ht 5' 6\" (1.676 m)   Wt 205 lb (93 kg)   SpO2 100%   BMI 33.09 kg/m²   General Appearance Alert, well appearing, No acute distress   Eyes clear   Ears, Nose, Throat clear    Neck Supple, non tender   Respiratory Lungs clear breath sounds bilateral, unlabored   Cardiovascular Heart regular rate and rythm   Gastrointestinal Abdomen: soft, non-tender, non-distended   Lymphatics No adenopathy   Musculoskeletal In splint  Good cap refill toes  Able to df/pf ankle   SILT  Left knee nontender  No other extremity deformities or tenderness   Skin Normal. No rash or lesions   Neurological Awake, alert and oriented. No focal deficits. Motor and sensory intact   Psychiatric Normal       LABS   No results for input(s): WBC, HEMOGLOBIN, HCT, PLT, NA, K, CL, CO2, BUN, CREATININE, CALCIUM, PHOS, ALBUMIN, MG, INR, PTT, CKMB, TROPONINI, AST, ALT, LIPASE, LACTATE, TSH in the last 72 hours. Invalid input(s): GLU, PT, CK1, TBILI, URINE  No components found for: HGBA1C    IMAGING  Left ankle trimalleolar fracture with posterior subluxation of the talus  ASSESSMENT     Patient Active Problem List   Diagnosis    Labor and delivery, indication for care    Closed trimalleolar fracture of left ankle          PLAN   1. Rapid covid  Npo after midnight  ORIF left ankle possible spanning external fixator depending on skin swelling/blistering  The patient was counseled at length about the risks of anton Covid-19 during their perioperative period and any recovery window from their procedure. The patient was made aware that anton Covid-19  may worsen their prognosis for recovering from their procedure  and lend to a higher morbidity and/or mortality risk. All material risks, benefits, and reasonable alternatives including postponing the procedure were discussed. The patient does wish to proceed with the procedure at this time. Will need to be PRONE to fix the ankle definitively.    If ex fix then supine      Electronically signed by: Gabriela Anderson MD, 12/1/2020 8:44 PM

## 2020-12-02 NOTE — PROGRESS NOTES
Pt. Arrived on unit, A&O, oriented to call light and surrounds. Vitals WNL, menu provided to order lunch. Call light in reach. External fixator in place to LLE.

## 2020-12-02 NOTE — ANESTHESIA PRE PROCEDURE
Department of Anesthesiology  Preprocedure Note       Name:  Isma Joseph   Age:  22 y.o.  :  1995                                          MRN:  [id number]         Date:  2020      Surgeon: Kary Meza):  Anna Lowry MD    Procedure: Procedure(s):  LEFT ANKLE EXTERNAL FIXATION    Medications prior to admission:   Prior to Admission medications    Medication Sig Start Date End Date Taking? Authorizing Provider   ondansetron (ZOFRAN ODT) 4 MG disintegrating tablet Take 1 tablet by mouth every 6 hours 18   Dorothy Lord MD   dicyclomine (BENTYL) 10 MG capsule Take 1 capsule by mouth 3 times daily as needed (cramping) 18   Dorothy Lord MD   cyclobenzaprine (FLEXERIL) 10 MG tablet Take 10 mg by mouth 3 times daily as needed for Muscle spasms    Historical Provider, MD   ipratropium-albuterol (DUONEB) 0.5-2.5 (3) MG/3ML SOLN nebulizer solution Inhale 1 vial into the lungs every 4 hours as needed for Shortness of Breath. Historical Provider, MD   Prenatal Multivit-Min-Fe-FA (PRE-SHANNON FORMULA PO) Take 1 tablet by mouth daily.     Historical Provider, MD       Current medications:    Current Facility-Administered Medications   Medication Dose Route Frequency Provider Last Rate Last Dose    acetaminophen (TYLENOL) tablet 650 mg  650 mg Oral Q6H PRN ALEK Mckeon CNP        Or    acetaminophen (TYLENOL) suppository 650 mg  650 mg Rectal Q6H PRN ALEK Mckeon - CNP        polyethylene glycol (GLYCOLAX) packet 17 g  17 g Oral Daily PRN Hawa Castro APRN - ANTHONY        promethazine (PHENERGAN) tablet 12.5 mg  12.5 mg Oral Q6H PRN ALEK Mckeon - CNP        Or    ondansetron (ZOFRAN) injection 4 mg  4 mg Intravenous Q6H PRN ALEK Mckeon - CNP        oxyCODONE-acetaminophen (PERCOCET) 5-325 MG per tablet 1 tablet  1 tablet Oral Q4H PRN Hawa Castro, APRN - CNP   1 tablet at 20 0458    ketorolac (TORADOL) injection 30 mg  30 mg Intravenous Q6H PRN Tito Ferrer APRN - CNP        0.9 % sodium chloride infusion   Intravenous Continuous Tito Ferrer APRN - CNP 75 mL/hr at 20 2307      heparin (porcine) injection 5,000 Units  5,000 Units Subcutaneous 3 times per day Tito Ferrer APRN - CNP   Stopped at 20 7700     Facility-Administered Medications Ordered in Other Encounters   Medication Dose Route Frequency Provider Last Rate Last Dose    propofol injection    PRN Ashley Kimberly, APRN - CRNA   200 mg at 20 0845    lidocaine (cardiac) (XYLOCAINE) injection    PRN Ashley Kimberly, APRN - CRNA   100 mg at 20 0845    dexamethasone (DECADRON) injection    PRN Ashley Kimberly, APRN - CRNA   8 mg at 20 0850    ondansetron (ZOFRAN) injection    PRN Ashley Kimberly, APRN - CRNA   4 mg at 20 0850    rocuronium (ZEMURON) injection    PRN Ashley Kimberly, APRN - CRNA   50 mg at 20 8973    succinylcholine chloride (ANECTINE) injection    PRN Ashley Kimberly, APRN - CRNA   140 mg at 20 0845    fentaNYL (SUBLIMAZE) injection    PRN Ashley Kimberly, APRN - CRNA   100 mcg at 20 5353    midazolam (VERSED) injection    PRN Ashley Kimberly, APRN - CRNA   2 mg at 20 0830    HYDROmorphone (DILAUDID) injection    PRN Ashley Kimberly, APRN - CRNA   0.5 mg at 20 0913    lactated ringers infusion    Continuous PRN Ashley Kimberly, APRN - CRNA           Allergies: Allergies   Allergen Reactions    Aspirin Anaphylaxis    Ibuprofen Swelling       Problem List:    Patient Active Problem List   Diagnosis Code    Labor and delivery, indication for care O75.9    Closed trimalleolar fracture of left ankle S82.852A    Fall at home, initial encounter [address] Marvin Hernandez, Y92.009       Past Medical History:        Diagnosis Date    Asthma     Bipolar 1 disorder ([facility] [address]     Depression     Pregnancy      premature rupture of membranes (PPROM) delivered, current hospitalization 2014     uterine contractions, antepartum 9/9/2014       Past Surgical History:  History reviewed. No pertinent surgical history. Social History:    Social History     Tobacco Use    Smoking status: Light Tobacco Smoker    Smokeless tobacco: Never Used   Substance Use Topics    Alcohol use: No                                Ready to quit: Not Answered  Counseling given: Not Answered      Vital Signs (Current):   Vitals:    12/02/20 0015 12/02/20 0030 12/02/20 0100 12/02/20 0130   BP: 129/82 120/75 125/70 119/82   Pulse:       Resp:       Temp:       TempSrc:       SpO2: 99% 96% 97% 96%   Weight:       Height:                                                  BP Readings from Last 3 Encounters:   12/02/20 119/82   12/02/20 (!) 116/45   06/28/18 133/84       NPO Status: Time of last liquid consumption: 0500                        Time of last solid consumption: 2300                        Date of last liquid consumption: 12/02/20                        Date of last solid food consumption: 12/01/20    BMI:   Wt Readings from Last 3 Encounters:   12/01/20 205 lb (93 kg)   06/28/18 205 lb (93 kg)   10/10/16 205 lb (93 kg)     Body mass index is 33.09 kg/m². CBC:   Lab Results   Component Value Date    WBC 10.4 12/02/2020    RBC 4.02 12/02/2020    HGB 12.7 12/02/2020    HCT 39.1 12/02/2020    MCV 97.3 12/02/2020    RDW 12.5 12/02/2020     12/02/2020       CMP:   Lab Results   Component Value Date     12/02/2020    K 4.0 12/02/2020     12/02/2020    CO2 23 12/02/2020    BUN 8 12/02/2020    CREATININE 0.8 12/02/2020    GFRAA >60 12/02/2020    LABGLOM >60 12/02/2020    GLUCOSE 98 12/02/2020    PROT 7.4 12/01/2020    CALCIUM 8.6 12/02/2020    BILITOT 0.2 12/01/2020    ALKPHOS 53 12/01/2020    AST 15 12/01/2020    ALT 12 12/01/2020       POC Tests: No results for input(s): POCGLU, POCNA, POCK, POCCL, POCBUN, POCHEMO, POCHCT in the last 72 hours.     Coags:   Lab Results   Component Value Date    PROTIME 12.0 12/01/2020    INR 0.99 12/01/2020    APTT 28.4 12/01/2020       HCG (If Applicable):   Lab Results   Component Value Date    PREGTESTUR NEGATIVE 06/29/2018        ABGs:   Lab Results   Component Value Date    PO2ART 23 09/12/2014    EPK0SCO 45.3 09/12/2014    IRZ5BBE 25 09/12/2014        Type & Screen (If Applicable):  No results found for: LABABO, LABRH    Drug/Infectious Status (If Applicable):  No results found for: HIV, HEPCAB    COVID-19 Screening (If Applicable):   Lab Results   Component Value Date    COVID19 NOT DETECTED 12/01/2020         Anesthesia Evaluation  Patient summary reviewed  Airway: Mallampati: II  TM distance: >3 FB   Neck ROM: full  Mouth opening: > = 3 FB Dental:          Pulmonary: breath sounds clear to auscultation  (+) asthma:                            Cardiovascular:            Rhythm: regular                      Neuro/Psych:   (+) psychiatric history:            GI/Hepatic/Renal:   (+) morbid obesity          Endo/Other:                     Abdominal:   (+) obese,         Vascular:                                        Anesthesia Plan      general, regional and MAC     ASA 2 - emergent       Induction: intravenous. MIPS: Postoperative opioids intended. Anesthetic plan and risks discussed with patient. Use of blood products discussed with patient whom consented to blood products. Plan discussed with CRNA.     Attending anesthesiologist reviewed and agrees with Miguel Sheikh MD   12/2/2020

## 2020-12-02 NOTE — ANESTHESIA PROCEDURE NOTES
Airway  Urgency: elective    Airway not difficult    General Information and Staff    Patient location during procedure: OR    Indications and Patient Condition  Indications for airway management: anesthesia  Preoxygenated: yes  Patient position: sniffing  Mask difficulty assessment: vent by bag mask    Final Airway Details  Final airway type: endotracheal airway      Successful airway: ETT  Cuffed: yes   Successful intubation technique: direct laryngoscopy  Facilitating devices/methods: intubating stylet  Endotracheal tube insertion site: oral  Blade: Weber  ETT size (mm): 7.5  Cormack-Lehane Classification: grade I - full view of glottis  Placement verified by: chest auscultation and capnometry   Inital cuff pressure (cm H2O): 8  Measured from: teeth  Number of attempts at approach: 1  Ventilation between attempts: bag mask    no

## 2020-12-02 NOTE — ANESTHESIA PROCEDURE NOTES
Peripheral Block    Patient location during procedure: pre-op  Start time: 12/2/2020 8:33 AM  End time: 12/2/2020 8:42 AM  Staffing  Anesthesiologist: Ulises Browning MD  Performed: anesthesiologist   Preanesthetic Checklist  Completed: patient identified, site marked, surgical consent, pre-op evaluation, timeout performed, IV checked, risks and benefits discussed, monitors and equipment checked, anesthesia consent given, oxygen available and patient being monitored  Peripheral Block  Patient position: supine  Prep: ChloraPrep  Patient monitoring: cardiac monitor, continuous pulse ox, continuous capnometry, frequent blood pressure checks and IV access  Block type: Sciatic  Laterality: left  Injection technique: single-shot  Procedures: ultrasound guided  Local infiltration: lidocaine  Infiltration strength: 2 %  Dose: 1 mL  Popliteal  Provider prep: mask, sterile gloves and sterile gown  Local infiltration: lidocaine  Needle  Needle type: combined needle/nerve stimulator   Needle gauge: 22 G  Needle length: 10 cm  Needle localization: nerve stimulator and ultrasound guidance  Test dose: negative  Assessment  Paresthesia pain: none  Slow fractionated injection: yes  Hemodynamics: stable  Additional Notes  No complications pt tolerated well, twitch extinguished 0.3mA  Medications Administered  Ropivacaine (NAROPIN) 0.5% injection, 30 mL  Reason for block: procedure for pain, post-op pain management and at surgeon's request

## 2020-12-03 PROCEDURE — 6360000002 HC RX W HCPCS: Performed by: PHYSICIAN ASSISTANT

## 2020-12-03 PROCEDURE — 97162 PT EVAL MOD COMPLEX 30 MIN: CPT

## 2020-12-03 PROCEDURE — 97166 OT EVAL MOD COMPLEX 45 MIN: CPT

## 2020-12-03 PROCEDURE — 6370000000 HC RX 637 (ALT 250 FOR IP): Performed by: NURSE PRACTITIONER

## 2020-12-03 PROCEDURE — 1200000000 HC SEMI PRIVATE

## 2020-12-03 PROCEDURE — 97535 SELF CARE MNGMENT TRAINING: CPT

## 2020-12-03 PROCEDURE — 6370000000 HC RX 637 (ALT 250 FOR IP): Performed by: INTERNAL MEDICINE

## 2020-12-03 PROCEDURE — 94761 N-INVAS EAR/PLS OXIMETRY MLT: CPT

## 2020-12-03 PROCEDURE — 0QSM35Z REPOSITION LEFT TARSAL WITH EXTERNAL FIXATION DEVICE, PERCUTANEOUS APPROACH: ICD-10-PCS | Performed by: ORTHOPAEDIC SURGERY

## 2020-12-03 PROCEDURE — 97530 THERAPEUTIC ACTIVITIES: CPT

## 2020-12-03 PROCEDURE — 99024 POSTOP FOLLOW-UP VISIT: CPT | Performed by: PHYSICIAN ASSISTANT

## 2020-12-03 PROCEDURE — C9113 INJ PANTOPRAZOLE SODIUM, VIA: HCPCS | Performed by: NURSE PRACTITIONER

## 2020-12-03 PROCEDURE — 2580000003 HC RX 258: Performed by: NURSE PRACTITIONER

## 2020-12-03 PROCEDURE — 6360000002 HC RX W HCPCS: Performed by: NURSE PRACTITIONER

## 2020-12-03 RX ORDER — CEPHALEXIN 500 MG/1
500 CAPSULE ORAL EVERY 6 HOURS SCHEDULED
Status: DISCONTINUED | OUTPATIENT
Start: 2020-12-03 | End: 2020-12-07 | Stop reason: HOSPADM

## 2020-12-03 RX ORDER — OXYCODONE HYDROCHLORIDE AND ACETAMINOPHEN 5; 325 MG/1; MG/1
1 TABLET ORAL EVERY 6 HOURS PRN
Qty: 12 TABLET | Refills: 0 | Status: SHIPPED | OUTPATIENT
Start: 2020-12-03 | End: 2020-12-06

## 2020-12-03 RX ADMIN — OXYCODONE HYDROCHLORIDE AND ACETAMINOPHEN 1 TABLET: 5; 325 TABLET ORAL at 12:19

## 2020-12-03 RX ADMIN — KETOROLAC TROMETHAMINE 30 MG: 30 INJECTION, SOLUTION INTRAMUSCULAR; INTRAVENOUS at 17:31

## 2020-12-03 RX ADMIN — SODIUM CHLORIDE: 9 INJECTION, SOLUTION INTRAVENOUS at 01:22

## 2020-12-03 RX ADMIN — SODIUM CHLORIDE, PRESERVATIVE FREE 10 ML: 5 INJECTION INTRAVENOUS at 21:40

## 2020-12-03 RX ADMIN — KETOROLAC TROMETHAMINE 30 MG: 30 INJECTION, SOLUTION INTRAMUSCULAR; INTRAVENOUS at 10:21

## 2020-12-03 RX ADMIN — ENOXAPARIN SODIUM 40 MG: 40 INJECTION SUBCUTANEOUS at 08:18

## 2020-12-03 RX ADMIN — OXYCODONE HYDROCHLORIDE AND ACETAMINOPHEN 1 TABLET: 5; 325 TABLET ORAL at 04:01

## 2020-12-03 RX ADMIN — OXYCODONE HYDROCHLORIDE AND ACETAMINOPHEN 1 TABLET: 5; 325 TABLET ORAL at 16:19

## 2020-12-03 RX ADMIN — CEPHALEXIN 500 MG: 500 CAPSULE ORAL at 12:26

## 2020-12-03 RX ADMIN — OXYCODONE HYDROCHLORIDE AND ACETAMINOPHEN 1 TABLET: 5; 325 TABLET ORAL at 21:39

## 2020-12-03 RX ADMIN — SODIUM CHLORIDE: 9 INJECTION, SOLUTION INTRAVENOUS at 12:25

## 2020-12-03 RX ADMIN — CEPHALEXIN 500 MG: 500 CAPSULE ORAL at 17:30

## 2020-12-03 RX ADMIN — OXYCODONE HYDROCHLORIDE AND ACETAMINOPHEN 1 TABLET: 5; 325 TABLET ORAL at 08:19

## 2020-12-03 RX ADMIN — PANTOPRAZOLE SODIUM 40 MG: 40 INJECTION, POWDER, LYOPHILIZED, FOR SOLUTION INTRAVENOUS at 10:15

## 2020-12-03 ASSESSMENT — PAIN DESCRIPTION - PROGRESSION: CLINICAL_PROGRESSION: NOT CHANGED

## 2020-12-03 ASSESSMENT — PAIN DESCRIPTION - LOCATION: LOCATION: LEG

## 2020-12-03 ASSESSMENT — PAIN DESCRIPTION - FREQUENCY: FREQUENCY: CONTINUOUS

## 2020-12-03 ASSESSMENT — PAIN - FUNCTIONAL ASSESSMENT: PAIN_FUNCTIONAL_ASSESSMENT: PREVENTS OR INTERFERES SOME ACTIVE ACTIVITIES AND ADLS

## 2020-12-03 ASSESSMENT — PAIN SCALES - GENERAL
PAINLEVEL_OUTOF10: 10
PAINLEVEL_OUTOF10: 10
PAINLEVEL_OUTOF10: 6
PAINLEVEL_OUTOF10: 7
PAINLEVEL_OUTOF10: 6
PAINLEVEL_OUTOF10: 6
PAINLEVEL_OUTOF10: 9

## 2020-12-03 ASSESSMENT — PAIN DESCRIPTION - DESCRIPTORS: DESCRIPTORS: ACHING

## 2020-12-03 ASSESSMENT — PAIN DESCRIPTION - PAIN TYPE: TYPE: SURGICAL PAIN;ACUTE PAIN

## 2020-12-03 ASSESSMENT — PAIN DESCRIPTION - ORIENTATION: ORIENTATION: LEFT

## 2020-12-03 ASSESSMENT — PAIN DESCRIPTION - ONSET: ONSET: ON-GOING

## 2020-12-03 NOTE — PROGRESS NOTES
Instructed Pt on self-administration of Lovenox shots. Pt gave self lovenox injection with writer supervision. Pt successfully gave injection and engaged safety mechanism. Pt educated on Sharps storage at home. Pt has no further questions at this time.

## 2020-12-03 NOTE — PROGRESS NOTES
Occupational Therapy  [address] [place] [address] OCCUPATIONAL THERAPY EVALUATION    History  [place]:  The encounter diagnosis was Closed trimalleolar fracture of left ankle, initial encounter. Restrictions:       Position Activity Restriction  Other position/activity restrictions: NWB LLE                   Communication with other providers: PT    Subjective:  Patient states:  \"I want to get out of here soon! \"  Pain:  No c/o during session  Patient goal:  home    Occupational profile (relevant social history and personal factors):    Social/Functional History  Lives With: Family(2 children)  Type of Home: House  Bathroom Shower/Tub: Tub/Shower unit  Additional Comments: She lives in a house with ~2 steps to enter. Her mother and boyfriend will provide near 24/7 assistance and supervision    Examination of body systems (includes body structures/functions, activity/participation limitations):  · Orientation: WFL   · Cognition:  WFL   · Observation:  Received pt in bed. Alert and cooperative. L ankle ex fix intact. · Vision:  Shock Treatment Management   · Hearing:  WFL   · ROM:  WFL BUE  · Strength: WFL BUE  · Sensation: not tested    ADLs  Feeding: INDEP    Grooming: SBA for balance sinkside hand wash, uses one UE to steady self intermittently ; can perform most grooming in seated    Dressing: UB WENDY in seated LB Richard for surgical ankle threading. Can mange hikes of underwear/shorts. Bathing: UB SBA in seated LB Richard, mostly in seated    Toileting: WENDY    *Some ADL determined per observation of actual ADL performance, functional mobility, balance, activity tolerance, and cognition.      AM-PAC 6 click short form for inpatient daily activity:  Raw Score: 21  24/24 = unimpaired  23/24 = 1-20% impaired   20/24-22/24 = 21-40% impaired  15/24-19/24 = 41-59% impaired   10/24-14/24 = 60%-79% impaired  7/24-9/24 = 80%-99% impaired  6/24 = 100% impaired    Functional Mobility  Bed mobility:   Supine to sit: WENDY c HOB slightly elevated    Sitting balance:

## 2020-12-03 NOTE — PROGRESS NOTES
Occupational Therapy  Evaluated pt this morning. She will need a FWW, w/c, and shower seat for safe discharge home. Full evaluation and write-up pending.     [address], OTR/L, [facility]   [id number]   9:49 AM, 12/3/2020

## 2020-12-03 NOTE — CARE COORDINATION
CM was notified by therapy that pt will need a wheelchair, a rolling walker and a shower bench. CM spoke with pt and she is in agreement for the equipment to be ordered. Pt lives home alone but states her mom and boyfriend will be staying with her to help her. Pt has PCP and insurance with affordable RX copays. Perfect serve sent to Dr. Abelardo Giordano for equipment orders. CM spoke with [facility] for DME needs. [facility] stated she would be able to get equipment arranged after orders are placed. Spoke with pt who stated that either her father in law or her boyfriend would be able to  equipment today after 3pm.    Dr. Abelardo Giordano, please copy and paste into your daily progress note for her equipment documentation. Bonnie Chahal was evaluated today and a DME order was entered for a wheeled walker because she requires this to successfully complete daily living tasks of eating, bathing, toileting, personal cares, ambulating, grooming and hygiene. A wheeled walker is necessary due to the patient's unsteady gait, upper body weakness, and inability to  an ambulation device; and she can ambulate only by pushing a walker instead of a lesser assistive device such as a cane, crutch, or standard walker. The need for this equipment was discussed with the patient and she understands and is in agreement. Bonnie Chahal was evaluated today and a DME order was entered for a standard wheelchair with elevating leg rests because she requires this to successfully complete daily living tasks of eating, bathing, toileting, personal cares, ambulating, grooming and hygiene. A standard manual wheelchair with elevating leg rest is necessary due to patient's impaired ambulation and mobility restrictions and would be unable to resolve these daily living tasks using a cane or walker.   The patient is capable of using a standard wheelchair with elevating leg rests safely in their home and can maneuver within their

## 2020-12-03 NOTE — PROGRESS NOTES
ORTHOPEDIC POST-OP PROGRESS NOTE    12/3/2020    Patient name: Brendan Castillo  : 1995    SUBJECTIVE   The patient was seen and examined. Brendan Castillo is POD#1 from Closed reduction external fixation left trimalleolar ankle fracture. Reports her pain is controlled in the ankle. Pain noted at pin sites. Reports the feeling of stiffness in the toes, but tries to wiggle them. Denies paresthesias. Discussed surgery and plan for staged procedure. All questions and concerns were addressed and discussed. OBJECTIVE     Vitals:    20 0815   BP: 117/66   Pulse: 84   Resp: 15   Temp: 98.1 °F (36.7 °C)   SpO2: 98%     I/O last 3 completed shifts: In:  [I.V.:1899; IV Piggyback:100]  Out:  [Urine:950; Blood:50]    Physical Exam:   GEN: A&O, NAD  MS: left ex-fix in place; medial fx blisters present; swollen ankle; calf soft; wiggles toes; SILT; CR<2sec; knee ROM non-tender    Labs:   CBC/COAGS  Recent Labs     20  0542   WBC 12.6* 10.4   HCT 43.4 39.1    237   INR 0.99  --      BMP  Recent Labs     20  0542    134*   K 4.2 4.0    101   CO2 18* 23   BUN 7 8   CREATININE 0.6 0.8         ASSESSMENT     22 y.o. female, POD#1    PLAN     1. Activity, PT/OT: NWB LLE  2. DVT prophylaxis: Lovenox x 21 days  3. Discharge planning: Would recommend remaining inpatient through weekend to allow for adequate ice, elevation and monitoring soft tissues. 4. Pin site prophylaxis: Keflex PO while ex-fix is in place, to continue until after ex-fix removed and pin sites dry  5. Plans for staged open reduction internal fixation once soft tissues improve. Possibly early next week. Patient expresses understanding and agreement. Will continue to monitor.     Electronically signed by:Summer Guillen PA-C, 12/3/2020 1:36 PM

## 2020-12-03 NOTE — OP NOTE
[address] [place] [address], [address] [place] [address]                                OPERATIVE REPORT    PATIENT NAME: Alejandro Parra          :        1995  MED REC NO:   [id number]                          ROOM:       [address] [place] [address]  ACCOUNT NO:   [de-identified]                           ADMIT DATE: 2020  PROVIDER:     Angel Kim MD    DATE OF PROCEDURE:  2020    PREOPERATIVE DIAGNOSIS:  Trimalleolar ankle fracture-dislocation. POSTOPERATIVE DIAGNOSES:  Trimalleolar ankle fracture-dislocation and  fracture blisters. SURGICAL PROCEDURE:  Closed reduction of trimalleolar ankle  fracture-dislocation with manipulation and application of uniplanar  external fixator. ATTENDING SURGEON:  Angel Kim MD    ASSISTANT:  Sommer Robles PA-C    ESTIMATED BLOOD LOSS:  Less than 50. DESCRIPTION OF PROCEDURE:  The patient was taken to the operating room. After satisfactory spinal anesthesia was induced, she was continued in  supine position and the left lower extremity was prepped and draped in a  sterile manner. A timeout was performed. After her splint was removed  after she was _____, we noted the large blood and clear fluid fracture  blisters over her posterior medial side and her medial malleolus;  therefore, planning was then for staged surgery. After the timeout was  performed, a calcaneal pin was placed from the medial calcaneus  laterally under fluoroscopic guidance. Then, traction was held on the  calcaneus with manipulation and reduction of the ankle. Two half pins  were placed in her tibia and a delta frame was placed. The ankle was  very unstable. The frame was tightened. Her talus then tended to  sublux anterior and posterior even with the delta frame on, so a  transverse wire was placed across the anterior tibia to medial and  lateral bars and this then stabilized the talus.   Kickstand was placed  to help

## 2020-12-03 NOTE — PROGRESS NOTES
Hospitalist Progress Note      Name:  Sakina Carrera /Age/Sex: 1995  (22 y.o. female)   MRN & CSN:  [id number] & [id number] Admission Date/Time: 2020  7:17 PM   Location:  [address] [place] [address]- PCP: 1 [facility]  Day: 3    Assessment and Plan:   Sakina Carrera is a 22 y.o.  female  who presents with Closed trimalleolar fracture of left ankle       · Closed Left trimalleolar ankle fracture s/p closed reduction, external fixation 2020  -S/P  fall  -On IV ancef,followed by daily keflex  -NWB, keep leg elevated  -Pain control, neuro checks  -Orthopedic surgery,plan for staged surgery following resolution of soft tissue, discussed with surgery, prefer to remain inpatient, plans for surgical intervention on Monday      Alcohol use - admits to drinking alcohol       -denied withdrawal symptoms, monitor for symptoms      Tobacco abuse - on Nicotine patch, tobacco cessation education.      Morbid obesity - current BMI 33.09       -Health maintenance: exercise, lifestyle modification, and reduced caloric intake            Chronic Illnesses:        -asthma - breathing stable on room air, no resp distress noted.       -bipolar 1 disorder       Diet DIET GENERAL;   DVT Prophylaxis [] Lovenox, []  Heparin, [] SCDs, []No VTE prophylaxis, patient ambulating   GI Prophylaxis [] PPI, [] H2 Blocker, [] No GI prophylaxis, patient is receiving diet/Tube Feeds   Code Status Full Code   Disposition Patient requires continued admission due to    MDM [] Low, [] Moderate,[]  High  Patient's risk as above due to      History of Present Illness:     Pt S&E. Doing good this morning, no complaints  10-14 point ROS reviewed negative, unless as noted above    Objective:        Intake/Output Summary (Last 24 hours) at 12/3/2020 1253  Last data filed at 12/3/2020 0425  Gross per 24 hour   Intake 1399 ml   Output 950 ml   Net 449 ml      Vitals:   Vitals:    20 0815   BP: 117/66   Pulse: 84   Resp: 15   Temp: 98.1 °F (36.7 °C)   SpO2: 98%     Physical Exam:    GEN Awake female, sitting upright in bed in no apparent distress. Appears given age. EYES Pupils are equally round. No scleral erythema, discharge, or conjunctivitis. HENT Mucous membranes are moist.   NECK No apparent thyromegaly or masses. RESP Clear to auscultation, no wheezes, rales or rhonchi. Symmetric chest movement while on room air. CARDIO/VASC S1/S2 auscultated. Regular rate without appreciable murmurs, rubs, or gallops. Peripheral pulses equal bilaterally and palpable. No peripheral edema. GI Abdomen is soft without significant tenderness, masses, or guarding. Bowel sounds are normoactive. Rectal exam deferred.  Luciano catheter is not present. HEME/LYMPH No petechiae or ecchymoses. MSK No gross joint deformities. Spontaneous movement of all extremities. Left extremity elevated, external fixator in place, fracture blisters at the ankle  SKIN Normal coloration, warm, dry. NEURO Cranial nerves appear grossly intact, normal speech, no lateralizing weakness. PSYCH Awake, alert, oriented x 4. Affect appropriate.     Medications:   Medications:    cephALEXin  500 mg Oral 4 times per day    sodium chloride flush  10 mL Intravenous 2 times per day    pantoprazole  40 mg Intravenous Daily    nicotine  1 patch Transdermal Daily    enoxaparin  40 mg Subcutaneous Daily      Infusions:    sodium chloride 75 mL/hr at 12/03/20 1225     PRN Meds: sodium chloride flush, 10 mL, PRN  acetaminophen, 650 mg, Q6H PRN    Or  acetaminophen, 650 mg, Q6H PRN  polyethylene glycol, 17 g, Daily PRN  promethazine, 12.5 mg, Q6H PRN    Or  ondansetron, 4 mg, Q6H PRN  oxyCODONE-acetaminophen, 1 tablet, Q4H PRN  ketorolac, 30 mg, Q6H PRN        Data    Recent Labs     12/01/20 2118 12/02/20  0542   WBC 12.6* 10.4   HGB 14.4 12.7   HCT 43.4 39.1    237      Recent Labs     12/01/20 2118 12/02/20  0542    134*   K 4.2

## 2020-12-03 NOTE — CONSULTS
[address] [facility] ACUTE CARE PHYSICAL THERAPY EVALUATION  Nelson Samaniego, 1995, 1120/1120-A, 12/3/2020    History  [place]:  The encounter diagnosis was Closed trimalleolar fracture of left ankle, initial encounter. Patient  has a past medical history of Asthma, Bipolar 1 disorder ([address] Depression, Pregnancy,  premature rupture of membranes (PPROM) delivered, current hospitalization, and  uterine contractions, antepartum. Patient  has no past surgical history on file. Subjective:  Patient states:  Agreeable to therapy. Pain:  3/10 surgical ankle. Communication with other providers:  Handoff to RN, co-eval with OT. Restrictions: NWB LLE    Home Setup/Prior level of function  Social/Functional History  Lives With: Family(2 children)  Type of Home: House  Bathroom Shower/Tub: Tub/Shower unit  Additional Comments: She lives in a house with ~2 steps to enter. Her mother and boyfriend will provide near 24/7 assistance and supervision    Examination of body systems (includes body structures/functions, activity/participation limitations):  · Observation:  Supine in bed upon arrival  · Vision:  [facility]  · Hearing:  [facility]  · Cardiopulmonary:  No O2 needs  · Cognition: WFL, see OT/SLP note for further evaluation. Musculoskeletal  · ROM R/L:  WFL. · Strength R/L:  L: 3+/5, R: 5/5, no overt weakness in function and endurance. · Neuro: WFL    · Gait pattern: hop to pattern with RW    Mobility:  · Supine to sit:  SBA  · Transfers: SBA  · Sitting balance:  SBA. · Standing balance:  SBA. · Gait: CGA    [facility] 6 Clicks Inpatient Mobility:      Treatment:  Sup to sit: SBA    Transfers: STS from bed and chair and commode SBA with RW, maintains NWB well. Gait: ambulated x10ft, x5ft with RW, CGA for safety. Discussed tub transfers and negotiation of stairs with WC.      Safety: patient left in chair, call light within reach, RN notified, gait belt used.    Assessment:  Patient is a 23 yo female who presents with ankle fracture, s/p closed reduction of L ankle with ex fix, 12/2. Patient demonstrates safe mobility, able to maintain NWB, will require WC and RW. Rec home with 24/7 assist.    Complexity: Moderate  Prognosis: Good, no significant barriers to participation at this time.    Equipment: RW, and WC    Recommendations for NURSING mobility: ambulate with RW and gait belt    Time:   Time in: 0920  Time out: 0944  Timed treatment minutes: 10  Total time: 24    Electronically signed by:    Eduardo Bradford, PT  12/3/2020, 12:17 PM

## 2020-12-04 PROCEDURE — 6370000000 HC RX 637 (ALT 250 FOR IP): Performed by: INTERNAL MEDICINE

## 2020-12-04 PROCEDURE — 6360000002 HC RX W HCPCS: Performed by: NURSE PRACTITIONER

## 2020-12-04 PROCEDURE — 6360000002 HC RX W HCPCS: Performed by: PHYSICIAN ASSISTANT

## 2020-12-04 PROCEDURE — 2580000003 HC RX 258: Performed by: NURSE PRACTITIONER

## 2020-12-04 PROCEDURE — 94761 N-INVAS EAR/PLS OXIMETRY MLT: CPT

## 2020-12-04 PROCEDURE — 6370000000 HC RX 637 (ALT 250 FOR IP): Performed by: NURSE PRACTITIONER

## 2020-12-04 PROCEDURE — C9113 INJ PANTOPRAZOLE SODIUM, VIA: HCPCS | Performed by: NURSE PRACTITIONER

## 2020-12-04 PROCEDURE — 1200000000 HC SEMI PRIVATE

## 2020-12-04 RX ADMIN — ENOXAPARIN SODIUM 40 MG: 40 INJECTION SUBCUTANEOUS at 10:55

## 2020-12-04 RX ADMIN — OXYCODONE HYDROCHLORIDE AND ACETAMINOPHEN 1 TABLET: 5; 325 TABLET ORAL at 10:55

## 2020-12-04 RX ADMIN — OXYCODONE HYDROCHLORIDE AND ACETAMINOPHEN 1 TABLET: 5; 325 TABLET ORAL at 05:38

## 2020-12-04 RX ADMIN — CEPHALEXIN 500 MG: 500 CAPSULE ORAL at 18:34

## 2020-12-04 RX ADMIN — SODIUM CHLORIDE: 9 INJECTION, SOLUTION INTRAVENOUS at 14:13

## 2020-12-04 RX ADMIN — KETOROLAC TROMETHAMINE 30 MG: 30 INJECTION, SOLUTION INTRAMUSCULAR; INTRAVENOUS at 00:55

## 2020-12-04 RX ADMIN — OXYCODONE HYDROCHLORIDE AND ACETAMINOPHEN 1 TABLET: 5; 325 TABLET ORAL at 15:31

## 2020-12-04 RX ADMIN — PANTOPRAZOLE SODIUM 40 MG: 40 INJECTION, POWDER, LYOPHILIZED, FOR SOLUTION INTRAVENOUS at 09:17

## 2020-12-04 RX ADMIN — CEPHALEXIN 500 MG: 500 CAPSULE ORAL at 05:37

## 2020-12-04 RX ADMIN — CEPHALEXIN 500 MG: 500 CAPSULE ORAL at 00:50

## 2020-12-04 RX ADMIN — OXYCODONE HYDROCHLORIDE AND ACETAMINOPHEN 1 TABLET: 5; 325 TABLET ORAL at 22:31

## 2020-12-04 RX ADMIN — CEPHALEXIN 500 MG: 500 CAPSULE ORAL at 14:11

## 2020-12-04 RX ADMIN — SODIUM CHLORIDE: 9 INJECTION, SOLUTION INTRAVENOUS at 01:40

## 2020-12-04 RX ADMIN — KETOROLAC TROMETHAMINE 30 MG: 30 INJECTION, SOLUTION INTRAMUSCULAR; INTRAVENOUS at 18:45

## 2020-12-04 ASSESSMENT — PAIN DESCRIPTION - ORIENTATION: ORIENTATION: LEFT

## 2020-12-04 ASSESSMENT — PAIN SCALES - GENERAL
PAINLEVEL_OUTOF10: 5
PAINLEVEL_OUTOF10: 10
PAINLEVEL_OUTOF10: 6
PAINLEVEL_OUTOF10: 7
PAINLEVEL_OUTOF10: 3
PAINLEVEL_OUTOF10: 6
PAINLEVEL_OUTOF10: 4

## 2020-12-04 ASSESSMENT — PAIN DESCRIPTION - DESCRIPTORS: DESCRIPTORS: ACHING

## 2020-12-04 ASSESSMENT — PAIN DESCRIPTION - PAIN TYPE: TYPE: ACUTE PAIN

## 2020-12-04 ASSESSMENT — PAIN DESCRIPTION - FREQUENCY: FREQUENCY: INTERMITTENT

## 2020-12-04 ASSESSMENT — PAIN DESCRIPTION - LOCATION: LOCATION: ANKLE

## 2020-12-04 NOTE — PROGRESS NOTES
Hospitalist Progress Note      Name:  Sakina Carrera /Age/Sex: 1995  (22 y.o. female)   MRN & CSN:  [id number] & [id number] Admission Date/Time: 2020  7:17 PM   Location:  [address] [place] [address]-A [facility]  Day: 4    Assessment and Plan:   Sakina Carrera is a 22 y.o.  female  who presents with Closed trimalleolar fracture of left ankle        Closed Left trimalleolar ankle fracture s/p closed reduction, external fixation 2020  -S/P  fall  -On PO Keflex  - Lovenox for DVT prophylaxis  -Orthopedic surgery,plan for staged surgery following resolution of soft tissue, discussed with surgery, prefer to remain inpatient, plans for surgical intervention on Monday     Alcohol use - admits to drinking alcohol       -denied withdrawal symptoms, monitor for symptoms     Tobacco abuse - on Nicotine patch, tobacco cessation education.     Morbid obesity - current BMI 33.09       -Health maintenance: exercise, lifestyle modification, and reduced caloric intake           Chronic Illnesses:        -asthma - breathing stable on room air, no resp distress noted.       -bipolar 1 disorder    Diet DIET GENERAL;   DVT Prophylaxis [] Lovenox, []  Heparin, [] SCDs, [] Ambulation   GI Prophylaxis [] PPI,  [] H2 Blocker,  [] Carafate,  [] Diet/Tube Feeds   Code Status Full Code   Disposition Patient requires continued admission due to post-op care   MDM [] Low, [] Moderate,[]  High  Patient's risk as above due to      History of Present Illness:     Pain controlled, no fevers or chills. Sore throat today s/p extubation. No fever or chills, nausea or vomiting. Objective:        Intake/Output Summary (Last 24 hours) at 2020 1103  Last data filed at 2020 0539  Gross per 24 hour   Intake 1896.69 ml   Output --   Net 1896.69 ml      Vitals:   Vitals:    20 1052   BP: 119/80   Pulse: 68   Resp: 16   Temp: 98 °F (36.7 °C)   SpO2: 98%     Physical Exam: GEN Awake female, sitting upright in bed in no apparent distress. Appears given age. EYES Pupils are equally round. No scleral erythema, discharge, or conjunctivitis. HENT Mucous membranes are moist.   NECK Supple, no apparent thyromegaly or masses. RESP Clear to auscultation, no wheezes, rales or rhonchi. Symmetric chest movement while on room air. CARDIO/VASC S1/S2 auscultated. Regular rate without appreciable murmurs, rubs, or gallops. GI Abdomen is soft without significant tenderness, masses, or guarding.  No costovertebral angle tenderness. HEME/LYMPH  No petechiae or ecchymoses. MSK No gross joint deformities. SKIN Normal coloration, warm, dry, external fixator in place, does have blistering over medial part of left ankle  NEURO Cranial nerves appear grossly intact, normal speechPSYCH Awake, alert, oriented x 4. Affect appropriate.     Medications:   Medications:    cephALEXin  500 mg Oral 4 times per day    sodium chloride flush  10 mL Intravenous 2 times per day    pantoprazole  40 mg Intravenous Daily    nicotine  1 patch Transdermal Daily    enoxaparin  40 mg Subcutaneous Daily      Infusions:    sodium chloride 75 mL/hr at 12/04/20 0140     PRN Meds: sodium chloride flush, 10 mL, PRN  acetaminophen, 650 mg, Q6H PRN    Or  acetaminophen, 650 mg, Q6H PRN  polyethylene glycol, 17 g, Daily PRN  promethazine, 12.5 mg, Q6H PRN    Or  ondansetron, 4 mg, Q6H PRN  oxyCODONE-acetaminophen, 1 tablet, Q4H PRN  ketorolac, 30 mg, Q6H PRN          Electronically signed by Howie Newton MD on 12/4/2020 at 11:03 AM

## 2020-12-05 LAB
ANION GAP SERPL CALCULATED.3IONS-SCNC: 9 MMOL/L (ref 4–16)
BASOPHILS ABSOLUTE: 0 K/CU MM
BASOPHILS RELATIVE PERCENT: 0.4 % (ref 0–1)
BUN BLDV-MCNC: 11 MG/DL (ref 6–23)
CALCIUM SERPL-MCNC: 8.2 MG/DL (ref 8.3–10.6)
CHLORIDE BLD-SCNC: 106 MMOL/L (ref 99–110)
CO2: 23 MMOL/L (ref 21–32)
CREAT SERPL-MCNC: 0.6 MG/DL (ref 0.6–1.1)
DIFFERENTIAL TYPE: ABNORMAL
EOSINOPHILS ABSOLUTE: 0.3 K/CU MM
EOSINOPHILS RELATIVE PERCENT: 3.8 % (ref 0–3)
GFR AFRICAN AMERICAN: >60 ML/MIN/1.73M2
GFR NON-AFRICAN AMERICAN: >60 ML/MIN/1.73M2
GLUCOSE BLD-MCNC: 88 MG/DL (ref 70–99)
HCT VFR BLD CALC: 33.9 % (ref 37–47)
HEMOGLOBIN: 10.7 GM/DL (ref 12.5–16)
IMMATURE NEUTROPHIL %: 0.3 % (ref 0–0.43)
LYMPHOCYTES ABSOLUTE: 2.7 K/CU MM
LYMPHOCYTES RELATIVE PERCENT: 34.9 % (ref 24–44)
MCH RBC QN AUTO: 30.6 PG (ref 27–31)
MCHC RBC AUTO-ENTMCNC: 31.6 % (ref 32–36)
MCV RBC AUTO: 96.9 FL (ref 78–100)
MONOCYTES ABSOLUTE: 0.6 K/CU MM
MONOCYTES RELATIVE PERCENT: 8 % (ref 0–4)
NUCLEATED RBC %: 0 %
PDW BLD-RTO: 12.1 % (ref 11.7–14.9)
PLATELET # BLD: 198 K/CU MM (ref 140–440)
PMV BLD AUTO: 10.5 FL (ref 7.5–11.1)
POTASSIUM SERPL-SCNC: 3.7 MMOL/L (ref 3.5–5.1)
RBC # BLD: 3.5 M/CU MM (ref 4.2–5.4)
SEGMENTED NEUTROPHILS ABSOLUTE COUNT: 4 K/CU MM
SEGMENTED NEUTROPHILS RELATIVE PERCENT: 52.6 % (ref 36–66)
SODIUM BLD-SCNC: 138 MMOL/L (ref 135–145)
TOTAL IMMATURE NEUTOROPHIL: 0.02 K/CU MM
TOTAL NUCLEATED RBC: 0 K/CU MM
WBC # BLD: 7.7 K/CU MM (ref 4–10.5)

## 2020-12-05 PROCEDURE — 6370000000 HC RX 637 (ALT 250 FOR IP): Performed by: NURSE PRACTITIONER

## 2020-12-05 PROCEDURE — 80048 BASIC METABOLIC PNL TOTAL CA: CPT

## 2020-12-05 PROCEDURE — 85025 COMPLETE CBC W/AUTO DIFF WBC: CPT

## 2020-12-05 PROCEDURE — 36415 COLL VENOUS BLD VENIPUNCTURE: CPT

## 2020-12-05 PROCEDURE — 6360000002 HC RX W HCPCS: Performed by: PHYSICIAN ASSISTANT

## 2020-12-05 PROCEDURE — 99024 POSTOP FOLLOW-UP VISIT: CPT | Performed by: ORTHOPAEDIC SURGERY

## 2020-12-05 PROCEDURE — C9113 INJ PANTOPRAZOLE SODIUM, VIA: HCPCS | Performed by: NURSE PRACTITIONER

## 2020-12-05 PROCEDURE — 2580000003 HC RX 258: Performed by: NURSE PRACTITIONER

## 2020-12-05 PROCEDURE — 6360000002 HC RX W HCPCS: Performed by: NURSE PRACTITIONER

## 2020-12-05 PROCEDURE — 94761 N-INVAS EAR/PLS OXIMETRY MLT: CPT

## 2020-12-05 PROCEDURE — 6370000000 HC RX 637 (ALT 250 FOR IP): Performed by: INTERNAL MEDICINE

## 2020-12-05 PROCEDURE — 1200000000 HC SEMI PRIVATE

## 2020-12-05 RX ORDER — DIPHENHYDRAMINE HYDROCHLORIDE 50 MG/ML
25 INJECTION INTRAMUSCULAR; INTRAVENOUS EVERY 6 HOURS PRN
Status: DISCONTINUED | OUTPATIENT
Start: 2020-12-05 | End: 2020-12-07 | Stop reason: HOSPADM

## 2020-12-05 RX ADMIN — CEPHALEXIN 500 MG: 500 CAPSULE ORAL at 17:30

## 2020-12-05 RX ADMIN — SODIUM CHLORIDE: 9 INJECTION, SOLUTION INTRAVENOUS at 03:43

## 2020-12-05 RX ADMIN — OXYCODONE HYDROCHLORIDE AND ACETAMINOPHEN 1 TABLET: 5; 325 TABLET ORAL at 09:50

## 2020-12-05 RX ADMIN — CEPHALEXIN 500 MG: 500 CAPSULE ORAL at 12:21

## 2020-12-05 RX ADMIN — ENOXAPARIN SODIUM 40 MG: 40 INJECTION SUBCUTANEOUS at 09:50

## 2020-12-05 RX ADMIN — KETOROLAC TROMETHAMINE 30 MG: 30 INJECTION, SOLUTION INTRAMUSCULAR; INTRAVENOUS at 14:25

## 2020-12-05 RX ADMIN — CEPHALEXIN 500 MG: 500 CAPSULE ORAL at 05:54

## 2020-12-05 RX ADMIN — CEPHALEXIN 500 MG: 500 CAPSULE ORAL at 00:02

## 2020-12-05 RX ADMIN — PANTOPRAZOLE SODIUM 40 MG: 40 INJECTION, POWDER, LYOPHILIZED, FOR SOLUTION INTRAVENOUS at 10:39

## 2020-12-05 RX ADMIN — SODIUM CHLORIDE, PRESERVATIVE FREE 10 ML: 5 INJECTION INTRAVENOUS at 22:35

## 2020-12-05 RX ADMIN — OXYCODONE HYDROCHLORIDE AND ACETAMINOPHEN 1 TABLET: 5; 325 TABLET ORAL at 21:40

## 2020-12-05 ASSESSMENT — PAIN SCALES - GENERAL
PAINLEVEL_OUTOF10: 4
PAINLEVEL_OUTOF10: 8
PAINLEVEL_OUTOF10: 8
PAINLEVEL_OUTOF10: 6

## 2020-12-05 ASSESSMENT — PAIN DESCRIPTION - PAIN TYPE
TYPE: ACUTE PAIN;SURGICAL PAIN
TYPE: ACUTE PAIN;SURGICAL PAIN

## 2020-12-05 ASSESSMENT — PAIN DESCRIPTION - ORIENTATION
ORIENTATION: LEFT
ORIENTATION: LEFT

## 2020-12-05 ASSESSMENT — PAIN DESCRIPTION - DESCRIPTORS
DESCRIPTORS: ACHING
DESCRIPTORS: ACHING;STABBING
DESCRIPTORS: ACHING;STABBING

## 2020-12-05 ASSESSMENT — PAIN DESCRIPTION - PROGRESSION: CLINICAL_PROGRESSION: NOT CHANGED

## 2020-12-05 ASSESSMENT — PAIN DESCRIPTION - ONSET: ONSET: PROGRESSIVE

## 2020-12-05 ASSESSMENT — PAIN DESCRIPTION - FREQUENCY
FREQUENCY: INTERMITTENT
FREQUENCY: INTERMITTENT

## 2020-12-05 ASSESSMENT — PAIN DESCRIPTION - LOCATION
LOCATION: ANKLE
LOCATION: ANKLE

## 2020-12-05 NOTE — PROGRESS NOTES
Pin Care completed - moderate amount of serosanguinous drainage noted. Cleansed pin sites with iodine/saline solution and re-wrapped with 4x4 and kerlix. MD notified of drainage.

## 2020-12-05 NOTE — PROGRESS NOTES
Pt alert and oriented. Ambulated to bathroom  with walker standby and tolerating well. Patient had complaints of pain today and treated with medication. Pin care was done and patient tolerated well. Pt vitals WNL, tolerating diet and fluids.

## 2020-12-06 PROCEDURE — 6370000000 HC RX 637 (ALT 250 FOR IP): Performed by: INTERNAL MEDICINE

## 2020-12-06 PROCEDURE — 6360000002 HC RX W HCPCS: Performed by: NURSE PRACTITIONER

## 2020-12-06 PROCEDURE — 6370000000 HC RX 637 (ALT 250 FOR IP): Performed by: NURSE PRACTITIONER

## 2020-12-06 PROCEDURE — 6360000002 HC RX W HCPCS: Performed by: PHYSICIAN ASSISTANT

## 2020-12-06 PROCEDURE — 2580000003 HC RX 258: Performed by: NURSE PRACTITIONER

## 2020-12-06 PROCEDURE — C9113 INJ PANTOPRAZOLE SODIUM, VIA: HCPCS | Performed by: NURSE PRACTITIONER

## 2020-12-06 PROCEDURE — 94761 N-INVAS EAR/PLS OXIMETRY MLT: CPT

## 2020-12-06 PROCEDURE — 1200000000 HC SEMI PRIVATE

## 2020-12-06 RX ADMIN — KETOROLAC TROMETHAMINE 30 MG: 30 INJECTION, SOLUTION INTRAMUSCULAR; INTRAVENOUS at 17:17

## 2020-12-06 RX ADMIN — PANTOPRAZOLE SODIUM 40 MG: 40 INJECTION, POWDER, LYOPHILIZED, FOR SOLUTION INTRAVENOUS at 10:48

## 2020-12-06 RX ADMIN — OXYCODONE HYDROCHLORIDE AND ACETAMINOPHEN 1 TABLET: 5; 325 TABLET ORAL at 08:18

## 2020-12-06 RX ADMIN — CEPHALEXIN 500 MG: 500 CAPSULE ORAL at 12:14

## 2020-12-06 RX ADMIN — SODIUM CHLORIDE, PRESERVATIVE FREE 10 ML: 5 INJECTION INTRAVENOUS at 08:19

## 2020-12-06 RX ADMIN — CEPHALEXIN 500 MG: 500 CAPSULE ORAL at 18:40

## 2020-12-06 RX ADMIN — CEPHALEXIN 500 MG: 500 CAPSULE ORAL at 00:17

## 2020-12-06 RX ADMIN — OXYCODONE HYDROCHLORIDE AND ACETAMINOPHEN 1 TABLET: 5; 325 TABLET ORAL at 13:23

## 2020-12-06 RX ADMIN — OXYCODONE HYDROCHLORIDE AND ACETAMINOPHEN 1 TABLET: 5; 325 TABLET ORAL at 22:21

## 2020-12-06 RX ADMIN — SODIUM CHLORIDE, PRESERVATIVE FREE 10 ML: 5 INJECTION INTRAVENOUS at 22:22

## 2020-12-06 RX ADMIN — CEPHALEXIN 500 MG: 500 CAPSULE ORAL at 05:30

## 2020-12-06 RX ADMIN — ENOXAPARIN SODIUM 40 MG: 40 INJECTION SUBCUTANEOUS at 08:19

## 2020-12-06 ASSESSMENT — PAIN SCALES - GENERAL
PAINLEVEL_OUTOF10: 5
PAINLEVEL_OUTOF10: 6
PAINLEVEL_OUTOF10: 7
PAINLEVEL_OUTOF10: 7
PAINLEVEL_OUTOF10: 8
PAINLEVEL_OUTOF10: 8

## 2020-12-06 ASSESSMENT — PAIN DESCRIPTION - DESCRIPTORS
DESCRIPTORS: ACHING;THROBBING
DESCRIPTORS: ACHING;THROBBING

## 2020-12-06 ASSESSMENT — PAIN DESCRIPTION - FREQUENCY: FREQUENCY: INTERMITTENT

## 2020-12-06 ASSESSMENT — PAIN DESCRIPTION - LOCATION
LOCATION: ANKLE

## 2020-12-06 ASSESSMENT — PAIN DESCRIPTION - ONSET: ONSET: PROGRESSIVE

## 2020-12-06 ASSESSMENT — PAIN DESCRIPTION - ORIENTATION
ORIENTATION: LEFT

## 2020-12-06 ASSESSMENT — PAIN DESCRIPTION - PROGRESSION: CLINICAL_PROGRESSION: NOT CHANGED

## 2020-12-06 ASSESSMENT — PAIN DESCRIPTION - PAIN TYPE
TYPE: SURGICAL PAIN

## 2020-12-06 ASSESSMENT — PAIN - FUNCTIONAL ASSESSMENT: PAIN_FUNCTIONAL_ASSESSMENT: PREVENTS OR INTERFERES SOME ACTIVE ACTIVITIES AND ADLS

## 2020-12-06 NOTE — PROGRESS NOTES
wheelchair with elevating leg rests because she requires this to successfully complete daily living tasks of eating, bathing, toileting, personal cares, ambulating, grooming and hygiene. A standard manual wheelchair with elevating leg rest is necessary due to patient's impaired ambulation and mobility restrictions and would be unable to resolve these daily living tasks using a cane or walker. The patient is capable of using a standard wheelchair with elevating leg rests safely in their home and can maneuver within their home with adequate access. There is a caregiver available to provide necessary assistance. The need for this equipment was discussed with the patient and she understands, is in agreement, and has not expressed an unwillingness to use the wheelchair.       Lisa May requires elevating legrest due to significant edema of the lower extremities and requires elevation from left ankle fracture with external fixator in place.     Lisa May requires a shower bench due to left ankle fracture with external fixator in place, and is physically incapable of utilizing regular shower facilities.  Current body weight is Weight: 215 lb (97.5 kg). Diet DIET GENERAL;   DVT Prophylaxis [] Lovenox, []  Heparin, [] SCDs, [] Ambulation   GI Prophylaxis [] PPI,  [] H2 Blocker,  [] Carafate,  [] Diet/Tube Feeds   Code Status Full Code   Disposition Patient requires continued admission due to post-op care   MDM [] Low, [] Moderate,[]  High  Patient's risk as above due to      History of Present Illness:     Pain better controlled this AM.  Sore throat resolved. Improved this AM and tolerating diet without any nausea or vomiting, fevers or chills, SOB or chest pain. Objective:        Intake/Output Summary (Last 24 hours) at 12/6/2020 1234  Last data filed at 12/6/2020 0818  Gross per 24 hour   Intake 370 ml   Output --   Net 370 ml      Vitals:   Vitals:    12/06/20 0816   BP: 121/78 Pulse: 84   Resp: 16   Temp: 98.8 °F (37.1 °C)   SpO2:      Physical Exam:     GEN    Awake female, sitting upright in bed in no apparent distress. Appears given age. EYES   Pupils are equally round. No scleral erythema, discharge, or conjunctivitis. HENT  Mucous membranes are moist.   NECK  Supple, no apparent thyromegaly or masses. RESP  Clear to auscultation, no wheezes, rales or rhonchi. Symmetric chest movement while on room air. CARDIO/VASC           S1/S2 auscultated. Regular rate without appreciable murmurs, rubs, or gallops. GI        Abdomen is soft without significant tenderness, masses, or guarding.        No costovertebral angle tenderness. HEME/LYMPH             No petechiae or ecchymoses. MSK    No gross joint deformities. SKIN    Normal coloration, warm, dry, external fixator in place, does have blistering over medial part of left ankle  NEURO           Cranial nerves appear grossly intact, normal speechPSYCH           Awake, alert, oriented x 4. Affect appropriate.     Medications:   Medications:    cephALEXin  500 mg Oral 4 times per day    sodium chloride flush  10 mL Intravenous 2 times per day    pantoprazole  40 mg Intravenous Daily    nicotine  1 patch Transdermal Daily    enoxaparin  40 mg Subcutaneous Daily      Infusions:   PRN Meds: diphenhydrAMINE, 25 mg, Q6H PRN  sodium chloride flush, 10 mL, PRN  acetaminophen, 650 mg, Q6H PRN    Or  acetaminophen, 650 mg, Q6H PRN  polyethylene glycol, 17 g, Daily PRN  promethazine, 12.5 mg, Q6H PRN    Or  ondansetron, 4 mg, Q6H PRN  oxyCODONE-acetaminophen, 1 tablet, Q4H PRN  ketorolac, 30 mg, Q6H PRN          Electronically signed by Suha Shaffer MD on 12/6/2020 at 12:34 PM

## 2020-12-06 NOTE — PROGRESS NOTES
Pt A&O, x1 assist with walker to bathroom and tolerating well. Complaints of pain and treated with medication. PIN care done today and tolerated well. Tolerating fluids and diet, Vitals WNL.

## 2020-12-06 NOTE — PROGRESS NOTES
Patient seen and examined  Medial fracture blister increased in size 5 by 6 cm by 2 cm protruding, very large pink/blood tinged and she also has an anterior lateral small blister 1 cm clear  I told her I dont think her skin will be ready for ORIF by early next week surgery and by the looks of her skin it may take up to 3 weeks for it to look better before we can do ORIF  and so we can start making plans for discharge  She said her mom will be helping her at home.

## 2020-12-07 ENCOUNTER — ANESTHESIA EVENT (OUTPATIENT)
Dept: OPERATING ROOM | Age: 25
End: 2020-12-07
Payer: COMMERCIAL

## 2020-12-07 VITALS
HEIGHT: 66 IN | TEMPERATURE: 98.7 F | HEART RATE: 97 BPM | RESPIRATION RATE: 17 BRPM | OXYGEN SATURATION: 99 % | BODY MASS INDEX: 38.41 KG/M2 | DIASTOLIC BLOOD PRESSURE: 65 MMHG | WEIGHT: 239 LBS | SYSTOLIC BLOOD PRESSURE: 122 MMHG
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
## 2020-12-07 PROBLEM — S82.852A CLOSED TRIMALLEOLAR FRACTURE OF LEFT ANKLE: Status: RESOLVED | Noted: 2020-12-01 | Resolved: 2020-12-07

## 2020-12-07 PROCEDURE — 6360000002 HC RX W HCPCS: Performed by: PHYSICIAN ASSISTANT

## 2020-12-07 PROCEDURE — C9113 INJ PANTOPRAZOLE SODIUM, VIA: HCPCS | Performed by: NURSE PRACTITIONER

## 2020-12-07 PROCEDURE — 2580000003 HC RX 258: Performed by: NURSE PRACTITIONER

## 2020-12-07 PROCEDURE — 6370000000 HC RX 637 (ALT 250 FOR IP): Performed by: INTERNAL MEDICINE

## 2020-12-07 PROCEDURE — 6360000002 HC RX W HCPCS: Performed by: NURSE PRACTITIONER

## 2020-12-07 PROCEDURE — 94761 N-INVAS EAR/PLS OXIMETRY MLT: CPT

## 2020-12-07 PROCEDURE — 6370000000 HC RX 637 (ALT 250 FOR IP): Performed by: NURSE PRACTITIONER

## 2020-12-07 RX ORDER — CEPHALEXIN 500 MG/1
500 CAPSULE ORAL 3 TIMES DAILY
Qty: 90 CAPSULE | Refills: 0 | Status: ON HOLD | OUTPATIENT
Start: 2020-12-07 | End: 2020-12-21 | Stop reason: HOSPADM

## 2020-12-07 RX ADMIN — PANTOPRAZOLE SODIUM 40 MG: 40 INJECTION, POWDER, LYOPHILIZED, FOR SOLUTION INTRAVENOUS at 09:49

## 2020-12-07 RX ADMIN — SODIUM CHLORIDE, PRESERVATIVE FREE 10 ML: 5 INJECTION INTRAVENOUS at 09:50

## 2020-12-07 RX ADMIN — CEPHALEXIN 500 MG: 500 CAPSULE ORAL at 00:29

## 2020-12-07 RX ADMIN — OXYCODONE HYDROCHLORIDE AND ACETAMINOPHEN 1 TABLET: 5; 325 TABLET ORAL at 09:49

## 2020-12-07 RX ADMIN — OXYCODONE HYDROCHLORIDE AND ACETAMINOPHEN 1 TABLET: 5; 325 TABLET ORAL at 14:38

## 2020-12-07 RX ADMIN — ENOXAPARIN SODIUM 40 MG: 40 INJECTION SUBCUTANEOUS at 09:49

## 2020-12-07 RX ADMIN — CEPHALEXIN 500 MG: 500 CAPSULE ORAL at 05:25

## 2020-12-07 RX ADMIN — CEPHALEXIN 500 MG: 500 CAPSULE ORAL at 12:15

## 2020-12-07 ASSESSMENT — PAIN SCALES - GENERAL
PAINLEVEL_OUTOF10: 5
PAINLEVEL_OUTOF10: 10

## 2020-12-07 ASSESSMENT — PAIN DESCRIPTION - ONSET: ONSET: PROGRESSIVE

## 2020-12-07 ASSESSMENT — PAIN DESCRIPTION - PROGRESSION: CLINICAL_PROGRESSION: NOT CHANGED

## 2020-12-07 ASSESSMENT — PAIN DESCRIPTION - PAIN TYPE: TYPE: SURGICAL PAIN

## 2020-12-07 ASSESSMENT — PAIN DESCRIPTION - FREQUENCY: FREQUENCY: INTERMITTENT

## 2020-12-07 ASSESSMENT — PAIN - FUNCTIONAL ASSESSMENT: PAIN_FUNCTIONAL_ASSESSMENT: PREVENTS OR INTERFERES SOME ACTIVE ACTIVITIES AND ADLS

## 2020-12-07 ASSESSMENT — PAIN DESCRIPTION - ORIENTATION: ORIENTATION: LEFT

## 2020-12-07 ASSESSMENT — PAIN DESCRIPTION - LOCATION: LOCATION: ANKLE

## 2020-12-07 ASSESSMENT — PAIN DESCRIPTION - DESCRIPTORS: DESCRIPTORS: ACHING;THROBBING

## 2020-12-07 NOTE — PLAN OF CARE
Problem: Falls - Risk of:  Goal: Will remain free from falls  Description: Will remain free from falls  12/7/2020 1102 by Patricia North RN  Outcome: Met This Shift  12/7/2020 0114 by Maranda Jay RN  Outcome: Ongoing  Goal: Absence of physical injury  Description: Absence of physical injury  12/7/2020 1102 by Patricia North RN  Outcome: Met This Shift  12/7/2020 0114 by Maranda Jay RN  Outcome: Ongoing

## 2020-12-07 NOTE — PROGRESS NOTES
Discharge instructions reviewed with patient, emphasizing the importance of f/u with Dr. Amelia Davey on Thursday. Peripheral IV removed w/o complications.  updated this nurse of DME ready so pt. Can D/C anytime. Pt. Updated.

## 2020-12-07 NOTE — ANESTHESIA PRE PROCEDURE
Department of Anesthesiology  Preprocedure Note       Name:  Gabriella Meier   Age:  22 y.o.  :  1995                                          MRN:  [id number]         Date:  2020      Surgeon: Amy Terrell):  Kelli Crow MD    Procedure: Procedure(s):  LEFT ANKLE EXTERNAL FIXATOR REMOVAL  LEFT ANKLE OPEN REDUCTION INTERNAL FIXATION    Medications prior to admission:   Prior to Admission medications    Medication Sig Start Date End Date Taking? Authorizing Provider   ondansetron (ZOFRAN ODT) 4 MG disintegrating tablet Take 1 tablet by mouth every 6 hours 18   Katelynn Alfaro MD   dicyclomine (BENTYL) 10 MG capsule Take 1 capsule by mouth 3 times daily as needed (cramping) 18   Katelynn Alfaro MD   cyclobenzaprine (FLEXERIL) 10 MG tablet Take 10 mg by mouth 3 times daily as needed for Muscle spasms    Historical Provider, MD   ipratropium-albuterol (DUONEB) 0.5-2.5 (3) MG/3ML SOLN nebulizer solution Inhale 1 vial into the lungs every 4 hours as needed for Shortness of Breath. Historical Provider, MD   Prenatal Multivit-Min-Fe-FA (PRE- FORMULA PO) Take 1 tablet by mouth daily. Historical Provider, MD       Current medications:    No current facility-administered medications for this visit. No current outpatient medications on file.      Facility-Administered Medications Ordered in Other Visits   Medication Dose Route Frequency Provider Last Rate Last Dose    diphenhydrAMINE (BENADRYL) injection 25 mg  25 mg Intravenous Q6H PRN Dom Greenwood MD        cephALEXin (KEFLEX) capsule 500 mg  500 mg Oral 4 times per day Quang Vega MD   500 mg at 20 0525    sodium chloride flush 0.9 % injection 10 mL  10 mL Intravenous 2 times per day Alyce Nasuti, APRN - CNP   10 mL at 20 0950    sodium chloride flush 0.9 % injection 10 mL  10 mL Intravenous PRN Alyce Jdi, APRN - CNP        pantoprazole (PROTONIX) injection 40 mg  40 mg Intravenous Daily Darlene Mathur, APRN - CNP   40 mg at 20 0949    nicotine (NICODERM CQ) 14 MG/24HR 1 patch  1 patch Transdermal Daily Kal Rump, APRN - CNP        enoxaparin (LOVENOX) injection 40 mg  40 mg Subcutaneous Daily Ely Barillas PA-C   40 mg at 20 0530    acetaminophen (TYLENOL) tablet 650 mg  650 mg Oral Q6H PRN Kal Rump, APRN - CNP        Or    acetaminophen (TYLENOL) suppository 650 mg  650 mg Rectal Q6H PRN Kal Rump, APRN - CNP        polyethylene glycol (GLYCOLAX) packet 17 g  17 g Oral Daily PRN Kal Rump, APRN - CNP        promethazine (PHENERGAN) tablet 12.5 mg  12.5 mg Oral Q6H PRN Kal Rump, APRN - CNP        Or    ondansetron (ZOFRAN) injection 4 mg  4 mg Intravenous Q6H PRN Kal Rump, APRN - CNP        oxyCODONE-acetaminophen (PERCOCET) 5-325 MG per tablet 1 tablet  1 tablet Oral Q4H PRN Kal Rump, APRN - CNP   1 tablet at 20 0949       Allergies: Allergies   Allergen Reactions    Aspirin Anaphylaxis    Ibuprofen Swelling       Problem List:    Patient Active Problem List   Diagnosis Code    Labor and delivery, indication for care O75.9    Closed trimalleolar fracture of left ankle S82.852A    Fall at home, initial encounter [address] Estela James, Y92.009       Past Medical History:        Diagnosis Date    Asthma     Bipolar 1 disorder ([facility] [address]     Depression     Pregnancy      premature rupture of membranes (PPROM) delivered, current hospitalization 2014     uterine contractions, antepartum 2014       Past Surgical History:  No past surgical history on file. Social History:    Social History     Tobacco Use    Smoking status: Heavy Tobacco Smoker     Packs/day: 0.50     Years: 5.00     Pack years: 2.50     Types: Cigarettes    Smokeless tobacco: Never Used   Substance Use Topics    Alcohol use: No                                Ready to quit: Not Answered  Counseling given: Not Answered      Vital Signs (Current):    There were no vitals filed for this visit. BP Readings from Last 3 Encounters:   12/07/20 132/84   12/02/20 (!) 115/57   06/28/18 133/84       NPO Status:                                                                                 BMI:   Wt Readings from Last 3 Encounters:   12/07/20 239 lb (108.4 kg)   06/28/18 205 lb (93 kg)   10/10/16 205 lb (93 kg)     There is no height or weight on file to calculate BMI.    CBC:   Lab Results   Component Value Date    WBC 7.7 12/05/2020    RBC 3.50 12/05/2020    HGB 10.7 12/05/2020    HCT 33.9 12/05/2020    MCV 96.9 12/05/2020    RDW 12.1 12/05/2020     12/05/2020       CMP:   Lab Results   Component Value Date     12/05/2020    K 3.7 12/05/2020     12/05/2020    CO2 23 12/05/2020    BUN 11 12/05/2020    CREATININE 0.6 12/05/2020    GFRAA >60 12/05/2020    LABGLOM >60 12/05/2020    GLUCOSE 88 12/05/2020    PROT 7.4 12/01/2020    CALCIUM 8.2 12/05/2020    BILITOT 0.2 12/01/2020    ALKPHOS 53 12/01/2020    AST 15 12/01/2020    ALT 12 12/01/2020       POC Tests: No results for input(s): POCGLU, POCNA, POCK, POCCL, POCBUN, POCHEMO, POCHCT in the last 72 hours.     Coags:   Lab Results   Component Value Date    PROTIME 12.0 12/01/2020    INR 0.99 12/01/2020    APTT 28.4 12/01/2020       HCG (If Applicable):   Lab Results   Component Value Date    PREGTESTUR NEGATIVE 06/29/2018        ABGs:   Lab Results   Component Value Date    PO2ART 23 09/12/2014    FRT8KCU 45.3 09/12/2014    RJX7ZOH 25 09/12/2014        Type & Screen (If Applicable):  No results found for: LABABO, LABRH    Drug/Infectious Status (If Applicable):  No results found for: HIV, HEPCAB    COVID-19 Screening (If Applicable):   Lab Results   Component Value Date    COVID19 NOT DETECTED 12/01/2020         Anesthesia Evaluation  Patient summary reviewed no history of anesthetic complications:   Airway: Mallampati: II  TM distance: >3 FB   Neck ROM: full  Mouth opening: > = 3 FB Dental:          Pulmonary: breath sounds clear to auscultation  (+) asthma:                            Cardiovascular:            Rhythm: regular             Beta Blocker:  Not on Beta Blocker         Neuro/Psych:   (+) psychiatric history:            GI/Hepatic/Renal:   (+) morbid obesity          Endo/Other:                     Abdominal:   (+) obese,         Vascular:                                        Anesthesia Plan      general and regional     ASA 2     (Chart review only.)  Induction: intravenous. MIPS: Postoperative opioids intended and Prophylactic antiemetics administered. Anesthetic plan and risks discussed with patient.                     ALEK Smith - CRNA   12/7/2020

## 2020-12-07 NOTE — DISCHARGE SUMMARY
Discharge Summary    Name:  Violeta Valdez /Age/Sex: 1995  (22 y.o. female)   MRN & CSN:  [id number] & [id number] Admission Date/Time: 2020  7:17 PM   Attending:  Lee Romero MD Discharging Physician: Lee Romero MD     Hospital Course:   Violeta Valdez is a 22 y.o.  female  who presents with Closed trimalleolar fracture of left ankle. Patient underwent closed reduction of trimalleolar ankle fracture and manipulation and application of uniplanar external fixator on 12/3. A stage procedure was planned however patient developed a large blister on her right medial ankle limiting the ability to do surgery while inpatient. It was decided the patient should follow-up with orthopedic surgery within 4 days of discharge for further planning of staged procedure. Patient was discharged with pain medication, Lovenox, Keflex per orthopedic surgery. Patient will have close follow-up with orthopedic surgery later this week. Closed Left trimalleolar ankle fracture s/p closed reduction, external fixation 2020  - f/u with Orthopaedic Surgery next week  - Discharged with pain meds, Lovenox, and Keflex; scripts written by Orthopaedic Surgery     Pruritis  - Resolved     Sore Throat  - Resolved     Alcohol use - admits to drinking alcohol       -denied withdrawal symptoms, monitor for symptoms     Tobacco abuse - on Nicotine patch, tobacco cessation education.     Morbid obesity - current BMI 33.09       -Health maintenance: exercise, lifestyle modification, and reduced caloric intake           Chronic Illnesses:        -asthma - breathing stable on room air, no resp distress noted.       -bipolar 1 disorder    Violeta Valdez was evaluated today and a DME order was entered for a wheeled walker because she requires this to successfully complete daily living tasks of eating, bathing, toileting, personal cares, ambulating, grooming and hygiene.   A wheeled walker is necessary due to the patient's unsteady gait, upper body weakness, and inability to  an ambulation device; and she can ambulate only by pushing a walker instead of a lesser assistive device such as a cane, crutch, or standard walker. The need for this equipment was discussed with the patient and she understands and is in agreement.     Ruthann Mittal was evaluated today and a DME order was entered for a standard wheelchair with elevating leg rests because she requires this to successfully complete daily living tasks of eating, bathing, toileting, personal cares, ambulating, grooming and hygiene. A standard manual wheelchair with elevating leg rest is necessary due to patient's impaired ambulation and mobility restrictions and would be unable to resolve these daily living tasks using a cane or walker. The patient is capable of using a standard wheelchair with elevating leg rests safely in their home and can maneuver within their home with adequate access. There is a caregiver available to provide necessary assistance. The need for this equipment was discussed with the patient and she understands, is in agreement, and has not expressed an unwillingness to use the wheelchair.       Ruthann Mittal requires elevating legrest due to significant edema of the lower extremities and requires elevation from left ankle fracture with external fixator in place.     Ruthann Mittal requires a shower bench due to left ankle fracture with external fixator in place, and is physically incapable of utilizing regular shower facilities.  Current body weight is Weight: 215 lb (97.5 kg). The patient expressed appropriate understanding of and agreement with the discharge recommendations, medications, and plan.      Consults this admission:  IP CONSULT TO ORTHOPEDIC SURGERY  IP CONSULT TO HOSPITALIST  IP CONSULT TO ORTHOPEDIC SURGERY  IP CONSULT TO [address] [place] [address] NEEDS    Discharge Instruction: Follow up appointments: Orthopaedic Surgery  Primary care physician:  within 2 weeks    Diet:  regular diet   Activity: activity as tolerated  Disposition: Discharged to:   [x]Home, []HHC, []SNF, []Acute Rehab, []Hospice   Condition on discharge: Stable    Discharge Medications:      Karlos Stallings, Ameya Chavira Medication Instructions PQR:[id number]    Printed on:20 7577   Medication Information                      cephALEXin (KEFLEX) 500 MG capsule  Take 1 capsule by mouth 3 times daily             cyclobenzaprine (FLEXERIL) 10 MG tablet  Take 10 mg by mouth 3 times daily as needed for Muscle spasms             dicyclomine (BENTYL) 10 MG capsule  Take 1 capsule by mouth 3 times daily as needed (cramping)             enoxaparin (LOVENOX) 40 MG/0.4ML injection  Inject 0.4 mLs into the skin daily for 21 days             ipratropium-albuterol (DUONEB) 0.5-2.5 (3) MG/3ML SOLN nebulizer solution  Inhale 1 vial into the lungs every 4 hours as needed for Shortness of Breath. ondansetron (ZOFRAN ODT) 4 MG disintegrating tablet  Take 1 tablet by mouth every 6 hours             Prenatal Multivit-Min-Fe-FA (PRE-SHANNON FORMULA PO)  Take 1 tablet by mouth daily. Objective Findings at Discharge:   /65   Pulse 97   Temp 98.7 °F (37.1 °C) (Oral)   Resp 17   Ht 5' 6\" (1.676 m)   Wt 239 lb (108.4 kg)   SpO2 99%   BMI 38.58 kg/m²            PHYSICAL EXAM   GEN    Awake female, sitting upright in bed in no apparent distress. Appears given age. EYES   Pupils are equally round.  No scleral erythema, discharge, or conjunctivitis. HENT  Mucous membranes are moist.   NECK  Supple, no apparent thyromegaly or masses. RESP  Clear to auscultation, no wheezes, rales or rhonchi.  Symmetric chest movement while on room air. CARDIO/VASC           S1/S2 auscultated. Regular rate without appreciable murmurs, rubs, or gallops.    Britt Deacon is soft without significant tenderness, masses, or guarding.        No costovertebral angle tenderness. HEME/LYMPH             No petechiae or ecchymoses. MSK    No gross joint deformities. SKIN    Normal coloration, warm, dry, external fixator in place, does have blistering over medial part of left ankle  NEURO           Cranial nerves appear grossly intact, normal speechPSYCH           Awake, alert, oriented x 4.  Affect appropriate. BMP/CBC  Recent Labs     12/05/20  0517      K 3.7      CO2 23   BUN 11   CREATININE 0.6   WBC 7.7   HCT 33.9*          IMAGING:     FINDINGS:    Severely comminuted medial malleolus fracture which is mildly displaced. Oblique comminuted distal fibular metadiaphyseal fracture.  The fibular    fracture line is at the level of the distal tibiofibular syndesmosis.  Mildly    displaced posterior malleolus fracture is seen with up to 11 mm of    distraction.         The anterior tibial plafond is anteriorly subluxated in relation to the talar    dome.  No abnormal widening of the medial or lateral ankle clear spaces.         Small fracture fragments are seen in the tibiotalar joint space.         Generalized subcutaneous edema is seen about the ankle and lower leg.         No evidence of malalignment of the tarsometatarsal joints.         Discharge Time of 35 minutes    Electronically signed by Enrique Adkins MD on 12/7/2020 at 3:39 PM

## 2020-12-07 NOTE — PROGRESS NOTES
Pt. States she feels like her \"heart is fluttering all over\", Vitals 132/84 (103), pulse 74. New order for continuous tele monitoring, continuous pulse ox, 12 lead EKG.

## 2020-12-07 NOTE — PROGRESS NOTES
ORTHOPEDIC POST-OP PROGRESS NOTE    2020    Patient name: David Montiel  : 1995    SUBJECTIVE   The patient was seen and examined. David Montiel is POD#5 from Closed reduction external fixation left trimalleolar ankle fracture. Reports her pain is controlled in the ankle. Pain noted at pin sites. Reports the feeling of stiffness/tightness in the toes, but tries to wiggle them. Denies paresthesias. Discussed surgery and plan for staged procedure but do not feel the soft tissues are amenable to ORIF yet and would recommend discharge with outpatient follow-up and return for procedure at a later date. All questions and concerns were addressed. Patient agrees with plan. OBJECTIVE     Vitals:    20 0945   BP: 132/84   Pulse: 74   Resp: 16   Temp: 98.7 °F (37.1 °C)   SpO2: 97%     I/O last 3 completed shifts: In: 36 [P.O.:720; I.V.:10]  Out: 650 [Urine:650]    Physical Exam:   GEN: A&O, NAD  MS: left ex-fix in place; medial fx blisters present enlarged and protruding 10x5 cm; small anterior blister; posterior lateral ankle without blister, skin intact; swollen ankle; calf soft; wiggles toes; SILT; CR<2sec; knee ROM non-tender    Labs:   CBC/COAGS  Recent Labs     20  0517   WBC 7.7   HCT 33.9*        BMP  Recent Labs     20  0517      K 3.7      CO2 23   BUN 11   CREATININE 0.6         ASSESSMENT     22 y.o. female, POD#5    PLAN     1. Activity, PT/OT: NWB LLE  2. DVT prophylaxis: Lovenox x 21 days (RX written for discharge)   3. Discharge planning: Discharge home and plan for outpatient follow-up and staged procedure as soft tissues not ready for surgery. 4. Pin site prophylaxis: Keflex PO while ex-fix is in place, to continue until after ex-fix removed and pin sites dry (RX written for discharge)  5. Plans for staged open reduction internal fixation once soft tissues improve. Follow-up Thursday in office to check soft tissues.  Patient expresses understanding and agreement.     Electronically signed by:Summer Cooper PA-C, 12/7/2020 2:51 PM

## 2020-12-08 ENCOUNTER — ANESTHESIA (OUTPATIENT)
Dept: OPERATING ROOM | Age: 25
End: 2020-12-08
Payer: COMMERCIAL

## 2020-12-09 NOTE — PROGRESS NOTES
ORTHOPEDIC PROGRESS NOTE    12/10/2020    Patient name: Beata Castillo  : 1995      SUBJECTIVE     Chief Complaint   Patient presents with    Post-Op Check     Left Trimalleolar ORIF DOS 2020     The patient was seen and examined. DOS/DOI: 2020  Procedure/Injury: Closed reduction left trimalleolar ankle fracture with application external fixator    The patient was discharged home from [facility] after medial fracture blisters did not improve and it was recommended she have more time for soft tissues to improve before proceeding with staged procedure, open reduction internal fixation left ankle. Reports her pain is controlled in the ankle. Almost out of Percocet. Pain noted at pin sites and is doing pin care. Reports the feeling of stiffness/tightness in the toes, and is wiggling them. Denies paresthesias. Discussed surgery and plan for staged procedure but do not feel the soft tissues are amenable to ORIF yet and would recommend return for procedure at a later date. All questions and concerns were addressed. Patient agrees with plan. OBJECTIVE     GEN: A&O, NAD  MS: left ex-fix in place; medial fx blisters present enlarged and protruding 10x5 cm; small anterior blister maturing and drying; posterior lateral ankle without blister and skin intact; swollen ankle; calf soft; wiggles toes; SILT; CR<2sec; knee ROM non-tender    X-rays: none    ASSESSMENT     22 y.o. female, 1 weeks post-surgery/injury    PLAN     - Activity: Non-weight bearing left leg  - Brace: None - in ex-fix  - Continue Keflex antibiotic for pin sites  - Pin care  - Continue Lovenox for DVT prophylaxis  - Refill Percocet  - Follow up: 1 week, discussed possibly excising fracture blister if has not popped spontaneously by then. Tentative OR scheduled for .     Electronically signed by:Summer Curry PA-C, 12/10/2020 10:44 AM [facility]

## 2020-12-10 ENCOUNTER — OFFICE VISIT (OUTPATIENT)
Dept: ORTHOPEDIC SURGERY | Age: 25
End: 2020-12-10

## 2020-12-10 VITALS — BODY MASS INDEX: 38.41 KG/M2 | RESPIRATION RATE: 18 BRPM | WEIGHT: 239 LBS | HEIGHT: 66 IN

## 2020-12-10 PROBLEM — S82.852D CLOSED TRIMALLEOLAR FRACTURE OF LEFT ANKLE WITH ROUTINE HEALING: Status: ACTIVE | Noted: 2020-12-01

## 2020-12-10 PROCEDURE — 99024 POSTOP FOLLOW-UP VISIT: CPT | Performed by: PHYSICIAN ASSISTANT

## 2020-12-10 RX ORDER — OXYCODONE HYDROCHLORIDE AND ACETAMINOPHEN 5; 325 MG/1; MG/1
1 TABLET ORAL EVERY 6 HOURS PRN
Qty: 28 TABLET | Refills: 0 | Status: SHIPPED | OUTPATIENT
Start: 2020-12-10 | End: 2020-12-17

## 2020-12-15 RX ORDER — DOCUSATE SODIUM 100 MG/1
100 CAPSULE, LIQUID FILLED ORAL 2 TIMES DAILY PRN
COMMUNITY

## 2020-12-15 NOTE — PROGRESS NOTES
.Surgery 12/21/20 @ 1100, arrival 0900               1. Do not eat or drink anything within 8 hours of your surgery - unless instructed by your doctor prior to surgery. This includes                   no water, chewing gum or mints. 2. Follow your directions as prescribed by the doctor for your procedure and medications. 3. Check with your Doctor regarding stopping Plavix, Coumadin, Lovenox,Effient,Pradaxa,Xarelto, Fragmin or other blood thinners and                   follow their instructions. 4. Do not smoke, and do not drink any alcoholic beverages 24 hours prior to surgery. This includes NA Beer. 5. You may brush your teeth and gargle the morning of surgery. DO NOT SWALLOW WATER   6. You MUST make arrangements for a responsible adult to take you home after your surgery and be able to check on you every couple                   hours for the day. You will not be allowed to leave alone or drive yourself home. It is strongly suggested someone stay with you the first 24                   hrs. Your surgery will be cancelled if you do not have a ride home. 7. Please wear simple, loose fitting clothing to the hospital.  Magalie Primus not bring valuables (money, credit cards, checkbooks, etc.) Do not wear any                   makeup (including no eye makeup) or nail polish on your fingers or toes. 8. DO NOT wear any jewelry or piercings on day of surgery. All body piercing jewelry must be removed. 9. If you have dentures, they will be removed before going to the OR; we will provide you a container. If you wear contact lenses or glasses,                  they will be removed; please bring a case for them. 10. If you  have a Living Will and Durable Power of  for Healthcare, please bring in a copy. 11. Please bring picture ID,  insurance card, paperwork from the doctors office    (H & P, Consent, & card for implantable devices).            12. Take a shower the night before or morning of your procedure, do not apply any lotion, oil or powder. 13. Wear a mask covering your nose & mouth when entering the hospital. Have your covid-19 test performed within 10 days of your                  surgery. Quarantine yourself after the test until after your surgery. Instructed to get covid test and quarantine.

## 2020-12-17 ENCOUNTER — OFFICE VISIT (OUTPATIENT)
Dept: ORTHOPEDIC SURGERY | Age: 25
End: 2020-12-17

## 2020-12-17 ENCOUNTER — HOSPITAL ENCOUNTER (OUTPATIENT)
Age: 25
Setting detail: SPECIMEN
Discharge: HOME | End: 2020-12-17
Payer: COMMERCIAL

## 2020-12-17 VITALS
HEART RATE: 79 BPM | RESPIRATION RATE: 16 BRPM | OXYGEN SATURATION: 97 % | HEIGHT: 66 IN | BODY MASS INDEX: 38.41 KG/M2 | WEIGHT: 239 LBS

## 2020-12-17 PROCEDURE — 99024 POSTOP FOLLOW-UP VISIT: CPT | Performed by: PHYSICIAN ASSISTANT

## 2020-12-17 PROCEDURE — U0002 COVID-19 LAB TEST NON-CDC: HCPCS

## 2020-12-17 NOTE — PATIENT INSTRUCTIONS
Continue nonweightbearing  No range of motion  Continue to watch the fracture blister on the side of the ankle  Ice and elevate as needed  Tylenol or Motrin for pain  Follow up 2 week post op for ankle surgery on Monday 12/21/2020

## 2020-12-17 NOTE — PROGRESS NOTES
ORTHOPEDIC PROGRESS NOTE    2020    Patient name: Jose Grimes  : 1995      SUBJECTIVE     Chief Complaint   Patient presents with    Post-Op Check     IT Exfix for Trimall Fx Skin Check     The patient was seen and examined. DOS/DOI: 2020  Procedure/Injury: Closed reduction left trimalleolar ankle fracture with application external fixator    The patient was discharged home from [facility] after medial fracture blisters did not improve and it was recommended she have more time for soft tissues to improve before proceeding with staged procedure, open reduction internal fixation left ankle. Reports her pain is controlled in the ankle. Pain noted at pin sites and is doing pin care. Reports the feeling of stiffness/tightness in the toes, and is wiggling them with much improvement from previous. Reports that she did have some drainage from blister a few days ago and it is deflating. Denies paresthesias. Discussed surgery and plan for staged procedure but do not feel the soft tissues are amenable to ORIF yet and would recommend return for procedure at a later date. All questions and concerns were addressed. Patient agrees with plan. OBJECTIVE     GEN: A&O, NAD  MS: left ex-fix in place; medial fx blisters present now less protrusion but same 10x5 cm circumference; smaller anterior blister dried; posterior lateral ankle without blister and skin intact; ankle swelling improving with skin wrinkles; calf soft; wiggles toes; SILT; CR<2sec; knee ROM non-tender    X-rays: none today    ASSESSMENT     22 y.o. female, 1 weeks post-surgery/injury    PLAN     - Activity: Non-weight bearing left leg  - Brace: None - in ex-fix  - Continue Keflex antibiotic for pin sites  - Pin care  - Medial blister debrided without signs of infection; non-adherent dressing placed. Instructed for daily dry dressing change and to notify of concerns. Patient tolerated well.    - Continue Lovenox for DVT prophylaxis - Follow up: OR scheduled for 12/21. Soft tissues improved and feel are ready for surgery. - COVID-19 test performed in office.     Electronically signed by:Summer Hector PA-C, 12/17/2020 10:06 AM

## 2020-12-19 LAB
SARS-COV-2: NOT DETECTED
SOURCE: NORMAL

## 2020-12-21 ENCOUNTER — APPOINTMENT (OUTPATIENT)
Dept: GENERAL RADIOLOGY | Age: 25
End: 2020-12-21
Attending: ORTHOPAEDIC SURGERY
Payer: COMMERCIAL

## 2020-12-21 ENCOUNTER — HOSPITAL ENCOUNTER (OUTPATIENT)
Age: 25
Setting detail: OUTPATIENT SURGERY
Discharge: HOME | End: 2020-12-21
Attending: ORTHOPAEDIC SURGERY | Admitting: ORTHOPAEDIC SURGERY
Payer: COMMERCIAL

## 2020-12-21 VITALS
HEIGHT: 66 IN | SYSTOLIC BLOOD PRESSURE: 118 MMHG | DIASTOLIC BLOOD PRESSURE: 73 MMHG | OXYGEN SATURATION: 96 % | WEIGHT: 239 LBS | BODY MASS INDEX: 38.41 KG/M2 | RESPIRATION RATE: 16 BRPM | TEMPERATURE: 97 F | HEART RATE: 100 BPM

## 2020-12-21 VITALS
RESPIRATION RATE: 2 BRPM | OXYGEN SATURATION: 99 % | DIASTOLIC BLOOD PRESSURE: 108 MMHG | TEMPERATURE: 97.9 F | SYSTOLIC BLOOD PRESSURE: 123 MMHG

## 2020-12-21 PROBLEM — S82.872G: Status: ACTIVE | Noted: 2020-12-01

## 2020-12-21 PROBLEM — S82.62XG: Status: ACTIVE | Noted: 2020-12-21

## 2020-12-21 LAB
PREGNANCY TEST URINE, POC: NEGATIVE
PREGNANCY TEST URINE, POC: NEGATIVE

## 2020-12-21 PROCEDURE — 76942 ECHO GUIDE FOR BIOPSY: CPT | Performed by: ANESTHESIOLOGY

## 2020-12-21 PROCEDURE — 81025 URINE PREGNANCY TEST: CPT

## 2020-12-21 PROCEDURE — 27828 TREAT LOWER LEG FRACTURE: CPT | Performed by: ORTHOPAEDIC SURGERY

## 2020-12-21 PROCEDURE — 2720000010 HC SURG SUPPLY STERILE: Performed by: ORTHOPAEDIC SURGERY

## 2020-12-21 PROCEDURE — 2580000003 HC RX 258: Performed by: ANESTHESIOLOGY

## 2020-12-21 PROCEDURE — 76000 FLUOROSCOPY <1 HR PHYS/QHP: CPT

## 2020-12-21 PROCEDURE — 2500000003 HC RX 250 WO HCPCS: Performed by: NURSE ANESTHETIST, CERTIFIED REGISTERED

## 2020-12-21 PROCEDURE — 6360000002 HC RX W HCPCS: Performed by: NURSE ANESTHETIST, CERTIFIED REGISTERED

## 2020-12-21 PROCEDURE — 3600000004 HC SURGERY LEVEL 4 BASE: Performed by: ORTHOPAEDIC SURGERY

## 2020-12-21 PROCEDURE — 3700000001 HC ADD 15 MINUTES (ANESTHESIA): Performed by: ORTHOPAEDIC SURGERY

## 2020-12-21 PROCEDURE — 7100000011 HC PHASE II RECOVERY - ADDTL 15 MIN: Performed by: ORTHOPAEDIC SURGERY

## 2020-12-21 PROCEDURE — C1713 ANCHOR/SCREW BN/BN,TIS/BN: HCPCS | Performed by: ORTHOPAEDIC SURGERY

## 2020-12-21 PROCEDURE — 3600000014 HC SURGERY LEVEL 4 ADDTL 15MIN: Performed by: ORTHOPAEDIC SURGERY

## 2020-12-21 PROCEDURE — 7100000010 HC PHASE II RECOVERY - FIRST 15 MIN: Performed by: ORTHOPAEDIC SURGERY

## 2020-12-21 PROCEDURE — 6360000002 HC RX W HCPCS: Performed by: ANESTHESIOLOGY

## 2020-12-21 PROCEDURE — 7100000001 HC PACU RECOVERY - ADDTL 15 MIN: Performed by: ORTHOPAEDIC SURGERY

## 2020-12-21 PROCEDURE — 7100000000 HC PACU RECOVERY - FIRST 15 MIN: Performed by: ORTHOPAEDIC SURGERY

## 2020-12-21 PROCEDURE — 2709999900 HC NON-CHARGEABLE SUPPLY: Performed by: ORTHOPAEDIC SURGERY

## 2020-12-21 PROCEDURE — 3700000000 HC ANESTHESIA ATTENDED CARE: Performed by: ORTHOPAEDIC SURGERY

## 2020-12-21 DEVICE — SCREW BNE L18MM DIA2.7MM ANK S STL ST VAR ANG LOK FULL THRD: Type: IMPLANTABLE DEVICE | Status: FUNCTIONAL

## 2020-12-21 DEVICE — SCREW BNE L28MM DIA2.7MM CORT S STL ST FULL THRD FOR SM: Type: IMPLANTABLE DEVICE | Status: FUNCTIONAL

## 2020-12-21 DEVICE — SCREW BNE L22MM DIA2.7MM CORT S STL ST FULL THRD FOR SM: Type: IMPLANTABLE DEVICE | Status: FUNCTIONAL

## 2020-12-21 DEVICE — WASHER ORTH DIA7MM FOR CANN SCR: Type: IMPLANTABLE DEVICE | Status: FUNCTIONAL

## 2020-12-21 DEVICE — SCREW BNE L38MM DIA4MM S STL CANN SHT 1/3 THRD SM HEX SOCK: Type: IMPLANTABLE DEVICE | Status: FUNCTIONAL

## 2020-12-21 DEVICE — SCREW BNE L28MM DIA3.5MM CORT S STL ST NONCANNULATED LOK: Type: IMPLANTABLE DEVICE | Status: FUNCTIONAL

## 2020-12-21 DEVICE — SCREW BNE L18MM DIA2.7MM CORT S STL ST LOK FULL THRD T8: Type: IMPLANTABLE DEVICE | Status: FUNCTIONAL

## 2020-12-21 DEVICE — IMPLANTABLE DEVICE: Type: IMPLANTABLE DEVICE | Site: ANKLE | Status: FUNCTIONAL

## 2020-12-21 DEVICE — SCREW BNE L30MM DIA2.7MM CORT S STL ST FULL THRD FOR SM: Type: IMPLANTABLE DEVICE | Status: FUNCTIONAL

## 2020-12-21 DEVICE — SCREW BNE L14MM DIA3.5MM CORT S STL ST NONCANNULATED LOK: Type: IMPLANTABLE DEVICE | Status: FUNCTIONAL

## 2020-12-21 DEVICE — SCREW BNE L36MM DIA2.7MM ANK S STL ST VAR ANG LOK FULL THRD: Type: IMPLANTABLE DEVICE | Status: FUNCTIONAL

## 2020-12-21 DEVICE — SCREW BNE L18MM DIA2.7MM CORT S STL ST FULL THRD FOR SM: Type: IMPLANTABLE DEVICE | Status: FUNCTIONAL

## 2020-12-21 DEVICE — SCREW BNE L16MM DIA2.7MM CORT S STL ST LOK FULL THRD T8: Type: IMPLANTABLE DEVICE | Status: FUNCTIONAL

## 2020-12-21 DEVICE — SCREW BNE L44MM DIA2.7MM ANK S STL ST VAR ANG LOK FULL THRD: Type: IMPLANTABLE DEVICE | Status: FUNCTIONAL

## 2020-12-21 RX ORDER — LABETALOL HYDROCHLORIDE 5 MG/ML
5 INJECTION, SOLUTION INTRAVENOUS EVERY 10 MIN PRN
Status: DISCONTINUED | OUTPATIENT
Start: 2020-12-21 | End: 2020-12-21 | Stop reason: HOSPADM

## 2020-12-21 RX ORDER — PROPOFOL 10 MG/ML
INJECTION, EMULSION INTRAVENOUS PRN
Status: DISCONTINUED | OUTPATIENT
Start: 2020-12-21 | End: 2020-12-21 | Stop reason: SDUPTHER

## 2020-12-21 RX ORDER — ROCURONIUM BROMIDE 10 MG/ML
INJECTION, SOLUTION INTRAVENOUS PRN
Status: DISCONTINUED | OUTPATIENT
Start: 2020-12-21 | End: 2020-12-21 | Stop reason: SDUPTHER

## 2020-12-21 RX ORDER — PROMETHAZINE HYDROCHLORIDE 25 MG/ML
6.25 INJECTION, SOLUTION INTRAMUSCULAR; INTRAVENOUS
Status: DISCONTINUED | OUTPATIENT
Start: 2020-12-21 | End: 2020-12-21 | Stop reason: HOSPADM

## 2020-12-21 RX ORDER — DEXAMETHASONE SODIUM PHOSPHATE 4 MG/ML
INJECTION, SOLUTION INTRA-ARTICULAR; INTRALESIONAL; INTRAMUSCULAR; INTRAVENOUS; SOFT TISSUE PRN
Status: DISCONTINUED | OUTPATIENT
Start: 2020-12-21 | End: 2020-12-21 | Stop reason: SDUPTHER

## 2020-12-21 RX ORDER — HYDROMORPHONE HCL 110MG/55ML
PATIENT CONTROLLED ANALGESIA SYRINGE INTRAVENOUS PRN
Status: DISCONTINUED | OUTPATIENT
Start: 2020-12-21 | End: 2020-12-21 | Stop reason: SDUPTHER

## 2020-12-21 RX ORDER — ROPIVACAINE HYDROCHLORIDE 5 MG/ML
INJECTION, SOLUTION EPIDURAL; INFILTRATION; PERINEURAL
Status: COMPLETED | OUTPATIENT
Start: 2020-12-21 | End: 2020-12-21

## 2020-12-21 RX ORDER — FENTANYL CITRATE 50 UG/ML
25 INJECTION, SOLUTION INTRAMUSCULAR; INTRAVENOUS EVERY 5 MIN PRN
Status: DISCONTINUED | OUTPATIENT
Start: 2020-12-21 | End: 2020-12-21 | Stop reason: HOSPADM

## 2020-12-21 RX ORDER — OXYCODONE HYDROCHLORIDE AND ACETAMINOPHEN 5; 325 MG/1; MG/1
2 TABLET ORAL EVERY 4 HOURS PRN
Qty: 56 TABLET | Refills: 0 | Status: SHIPPED | OUTPATIENT
Start: 2020-12-21 | End: 2020-12-28

## 2020-12-21 RX ORDER — CEPHALEXIN 500 MG/1
500 CAPSULE ORAL 2 TIMES DAILY
Qty: 42 CAPSULE | Refills: 0 | Status: SHIPPED | OUTPATIENT
Start: 2020-12-21 | End: 2021-02-11 | Stop reason: ALTCHOICE

## 2020-12-21 RX ORDER — HYDRALAZINE HYDROCHLORIDE 20 MG/ML
5 INJECTION INTRAMUSCULAR; INTRAVENOUS EVERY 10 MIN PRN
Status: DISCONTINUED | OUTPATIENT
Start: 2020-12-21 | End: 2020-12-21 | Stop reason: HOSPADM

## 2020-12-21 RX ORDER — MORPHINE SULFATE 2 MG/ML
2 INJECTION, SOLUTION INTRAMUSCULAR; INTRAVENOUS EVERY 5 MIN PRN
Status: DISCONTINUED | OUTPATIENT
Start: 2020-12-21 | End: 2020-12-21 | Stop reason: HOSPADM

## 2020-12-21 RX ORDER — HYDROMORPHONE HCL 110MG/55ML
0.25 PATIENT CONTROLLED ANALGESIA SYRINGE INTRAVENOUS EVERY 5 MIN PRN
Status: DISCONTINUED | OUTPATIENT
Start: 2020-12-21 | End: 2020-12-21 | Stop reason: HOSPADM

## 2020-12-21 RX ORDER — MIDAZOLAM HYDROCHLORIDE 1 MG/ML
INJECTION INTRAMUSCULAR; INTRAVENOUS PRN
Status: DISCONTINUED | OUTPATIENT
Start: 2020-12-21 | End: 2020-12-21 | Stop reason: SDUPTHER

## 2020-12-21 RX ORDER — FENTANYL CITRATE 50 UG/ML
INJECTION, SOLUTION INTRAMUSCULAR; INTRAVENOUS PRN
Status: DISCONTINUED | OUTPATIENT
Start: 2020-12-21 | End: 2020-12-21 | Stop reason: SDUPTHER

## 2020-12-21 RX ORDER — OXYCODONE HYDROCHLORIDE AND ACETAMINOPHEN 5; 325 MG/1; MG/1
1 TABLET ORAL EVERY 4 HOURS PRN
Status: ON HOLD | COMMUNITY
End: 2020-12-21 | Stop reason: SDUPTHER

## 2020-12-21 RX ORDER — CEFAZOLIN SODIUM 2 G/50ML
SOLUTION INTRAVENOUS PRN
Status: DISCONTINUED | OUTPATIENT
Start: 2020-12-21 | End: 2020-12-21 | Stop reason: SDUPTHER

## 2020-12-21 RX ORDER — SODIUM CHLORIDE, SODIUM LACTATE, POTASSIUM CHLORIDE, CALCIUM CHLORIDE 600; 310; 30; 20 MG/100ML; MG/100ML; MG/100ML; MG/100ML
INJECTION, SOLUTION INTRAVENOUS CONTINUOUS
Status: DISCONTINUED | OUTPATIENT
Start: 2020-12-21 | End: 2020-12-21 | Stop reason: HOSPADM

## 2020-12-21 RX ORDER — HYDROMORPHONE HCL 110MG/55ML
0.5 PATIENT CONTROLLED ANALGESIA SYRINGE INTRAVENOUS EVERY 5 MIN PRN
Status: DISCONTINUED | OUTPATIENT
Start: 2020-12-21 | End: 2020-12-21 | Stop reason: HOSPADM

## 2020-12-21 RX ORDER — ONDANSETRON 2 MG/ML
INJECTION INTRAMUSCULAR; INTRAVENOUS PRN
Status: DISCONTINUED | OUTPATIENT
Start: 2020-12-21 | End: 2020-12-21 | Stop reason: SDUPTHER

## 2020-12-21 RX ORDER — LIDOCAINE HYDROCHLORIDE 20 MG/ML
INJECTION, SOLUTION INTRAVENOUS PRN
Status: DISCONTINUED | OUTPATIENT
Start: 2020-12-21 | End: 2020-12-21 | Stop reason: SDUPTHER

## 2020-12-21 RX ADMIN — ONDANSETRON 4 MG: 2 INJECTION INTRAMUSCULAR; INTRAVENOUS at 13:53

## 2020-12-21 RX ADMIN — ROPIVACAINE HYDROCHLORIDE 20 ML: 5 INJECTION, SOLUTION EPIDURAL; INFILTRATION; PERINEURAL at 10:30

## 2020-12-21 RX ADMIN — HYDROMORPHONE HYDROCHLORIDE 0.4 MG: 2 INJECTION INTRAMUSCULAR; INTRAVENOUS; SUBCUTANEOUS at 12:59

## 2020-12-21 RX ADMIN — HYDROMORPHONE HYDROCHLORIDE 0.4 MG: 2 INJECTION INTRAMUSCULAR; INTRAVENOUS; SUBCUTANEOUS at 12:30

## 2020-12-21 RX ADMIN — SODIUM CHLORIDE, POTASSIUM CHLORIDE, SODIUM LACTATE AND CALCIUM CHLORIDE: 600; 310; 30; 20 INJECTION, SOLUTION INTRAVENOUS at 09:46

## 2020-12-21 RX ADMIN — SODIUM CHLORIDE, POTASSIUM CHLORIDE, SODIUM LACTATE AND CALCIUM CHLORIDE: 600; 310; 30; 20 INJECTION, SOLUTION INTRAVENOUS at 12:17

## 2020-12-21 RX ADMIN — SUGAMMADEX 200 MG: 100 INJECTION, SOLUTION INTRAVENOUS at 13:53

## 2020-12-21 RX ADMIN — MIDAZOLAM 2 MG: 1 INJECTION INTRAMUSCULAR; INTRAVENOUS at 10:24

## 2020-12-21 RX ADMIN — ROCURONIUM BROMIDE 50 MG: 10 INJECTION INTRAVENOUS at 10:38

## 2020-12-21 RX ADMIN — HYDROMORPHONE HYDROCHLORIDE 0.4 MG: 2 INJECTION INTRAMUSCULAR; INTRAVENOUS; SUBCUTANEOUS at 14:20

## 2020-12-21 RX ADMIN — ROCURONIUM BROMIDE 20 MG: 10 INJECTION INTRAVENOUS at 11:59

## 2020-12-21 RX ADMIN — HYDROMORPHONE HYDROCHLORIDE 0.4 MG: 2 INJECTION INTRAMUSCULAR; INTRAVENOUS; SUBCUTANEOUS at 12:35

## 2020-12-21 RX ADMIN — PROPOFOL 200 MG: 10 INJECTION, EMULSION INTRAVENOUS at 10:38

## 2020-12-21 RX ADMIN — LIDOCAINE HYDROCHLORIDE 100 MG: 20 INJECTION, SOLUTION INTRAVENOUS at 10:38

## 2020-12-21 RX ADMIN — FENTANYL CITRATE 100 MCG: 50 INJECTION INTRAMUSCULAR; INTRAVENOUS at 10:35

## 2020-12-21 RX ADMIN — ROCURONIUM BROMIDE 30 MG: 10 INJECTION INTRAVENOUS at 11:12

## 2020-12-21 RX ADMIN — HYDROMORPHONE HYDROCHLORIDE 0.2 MG: 2 INJECTION INTRAMUSCULAR; INTRAVENOUS; SUBCUTANEOUS at 14:13

## 2020-12-21 RX ADMIN — HYDROMORPHONE HYDROCHLORIDE 0.2 MG: 2 INJECTION INTRAMUSCULAR; INTRAVENOUS; SUBCUTANEOUS at 14:16

## 2020-12-21 RX ADMIN — DEXAMETHASONE SODIUM PHOSPHATE 8 MG: 4 INJECTION, SOLUTION INTRAMUSCULAR; INTRAVENOUS at 10:44

## 2020-12-21 RX ADMIN — ROCURONIUM BROMIDE 20 MG: 10 INJECTION INTRAVENOUS at 11:35

## 2020-12-21 RX ADMIN — CEFAZOLIN SODIUM 1 G: 2 SOLUTION INTRAVENOUS at 13:24

## 2020-12-21 RX ADMIN — CEFAZOLIN SODIUM 2 G: 2 SOLUTION INTRAVENOUS at 10:44

## 2020-12-21 ASSESSMENT — PULMONARY FUNCTION TESTS
PIF_VALUE: 20
PIF_VALUE: 17
PIF_VALUE: 18
PIF_VALUE: 15
PIF_VALUE: 18
PIF_VALUE: 23
PIF_VALUE: 18
PIF_VALUE: 23
PIF_VALUE: 18
PIF_VALUE: 23
PIF_VALUE: 20
PIF_VALUE: 18
PIF_VALUE: 18
PIF_VALUE: 23
PIF_VALUE: 18
PIF_VALUE: 17
PIF_VALUE: 4
PIF_VALUE: 0
PIF_VALUE: 23
PIF_VALUE: 19
PIF_VALUE: 17
PIF_VALUE: 18
PIF_VALUE: 23
PIF_VALUE: 18
PIF_VALUE: 23
PIF_VALUE: 23
PIF_VALUE: 18
PIF_VALUE: 18
PIF_VALUE: 1
PIF_VALUE: 20
PIF_VALUE: 18
PIF_VALUE: 17
PIF_VALUE: 23
PIF_VALUE: 20
PIF_VALUE: 17
PIF_VALUE: 23
PIF_VALUE: 17
PIF_VALUE: 18
PIF_VALUE: 23
PIF_VALUE: 25
PIF_VALUE: 17
PIF_VALUE: 20
PIF_VALUE: 18
PIF_VALUE: 17
PIF_VALUE: 23
PIF_VALUE: 20
PIF_VALUE: 18
PIF_VALUE: 20
PIF_VALUE: 23
PIF_VALUE: 18
PIF_VALUE: 23
PIF_VALUE: 18
PIF_VALUE: 20
PIF_VALUE: 20
PIF_VALUE: 2
PIF_VALUE: 18
PIF_VALUE: 15
PIF_VALUE: 23
PIF_VALUE: 19
PIF_VALUE: 20
PIF_VALUE: 17
PIF_VALUE: 18
PIF_VALUE: 18
PIF_VALUE: 25
PIF_VALUE: 18
PIF_VALUE: 23
PIF_VALUE: 18
PIF_VALUE: 17
PIF_VALUE: 18
PIF_VALUE: 19
PIF_VALUE: 18
PIF_VALUE: 17
PIF_VALUE: 18
PIF_VALUE: 23
PIF_VALUE: 16
PIF_VALUE: 17
PIF_VALUE: 0
PIF_VALUE: 19
PIF_VALUE: 18
PIF_VALUE: 23
PIF_VALUE: 18
PIF_VALUE: 20
PIF_VALUE: 19
PIF_VALUE: 23
PIF_VALUE: 18
PIF_VALUE: 18
PIF_VALUE: 6
PIF_VALUE: 17
PIF_VALUE: 23
PIF_VALUE: 23
PIF_VALUE: 18
PIF_VALUE: 23
PIF_VALUE: 20
PIF_VALUE: 23
PIF_VALUE: 23
PIF_VALUE: 18
PIF_VALUE: 20
PIF_VALUE: 23
PIF_VALUE: 20
PIF_VALUE: 17
PIF_VALUE: 1
PIF_VALUE: 20
PIF_VALUE: 20
PIF_VALUE: 17
PIF_VALUE: 18
PIF_VALUE: 15
PIF_VALUE: 23
PIF_VALUE: 20
PIF_VALUE: 19
PIF_VALUE: 17
PIF_VALUE: 18
PIF_VALUE: 23
PIF_VALUE: 18
PIF_VALUE: 18
PIF_VALUE: 21
PIF_VALUE: 20
PIF_VALUE: 23
PIF_VALUE: 15
PIF_VALUE: 23
PIF_VALUE: 17
PIF_VALUE: 23
PIF_VALUE: 15
PIF_VALUE: 17
PIF_VALUE: 18
PIF_VALUE: 19
PIF_VALUE: 18
PIF_VALUE: 18
PIF_VALUE: 4
PIF_VALUE: 22
PIF_VALUE: 20
PIF_VALUE: 18
PIF_VALUE: 18
PIF_VALUE: 23
PIF_VALUE: 17
PIF_VALUE: 23
PIF_VALUE: 20
PIF_VALUE: 23
PIF_VALUE: 18
PIF_VALUE: 20
PIF_VALUE: 23
PIF_VALUE: 18
PIF_VALUE: 3
PIF_VALUE: 18
PIF_VALUE: 20
PIF_VALUE: 28
PIF_VALUE: 18
PIF_VALUE: 23
PIF_VALUE: 23
PIF_VALUE: 9
PIF_VALUE: 18
PIF_VALUE: 20
PIF_VALUE: 20
PIF_VALUE: 18
PIF_VALUE: 18
PIF_VALUE: 20
PIF_VALUE: 23
PIF_VALUE: 18
PIF_VALUE: 20
PIF_VALUE: 20
PIF_VALUE: 23
PIF_VALUE: 20
PIF_VALUE: 0
PIF_VALUE: 17
PIF_VALUE: 23
PIF_VALUE: 2
PIF_VALUE: 18
PIF_VALUE: 23
PIF_VALUE: 23
PIF_VALUE: 16
PIF_VALUE: 20
PIF_VALUE: 18
PIF_VALUE: 23
PIF_VALUE: 23
PIF_VALUE: 19
PIF_VALUE: 18
PIF_VALUE: 20
PIF_VALUE: 23
PIF_VALUE: 6
PIF_VALUE: 24
PIF_VALUE: 23
PIF_VALUE: 18
PIF_VALUE: 1
PIF_VALUE: 18
PIF_VALUE: 24
PIF_VALUE: 23
PIF_VALUE: 0
PIF_VALUE: 0
PIF_VALUE: 16
PIF_VALUE: 18
PIF_VALUE: 20
PIF_VALUE: 18
PIF_VALUE: 19
PIF_VALUE: 20
PIF_VALUE: 20
PIF_VALUE: 16
PIF_VALUE: 18
PIF_VALUE: 20
PIF_VALUE: 18
PIF_VALUE: 23
PIF_VALUE: 18
PIF_VALUE: 23
PIF_VALUE: 23
PIF_VALUE: 20
PIF_VALUE: 18
PIF_VALUE: 18
PIF_VALUE: 23
PIF_VALUE: 20
PIF_VALUE: 18

## 2020-12-21 ASSESSMENT — PAIN DESCRIPTION - LOCATION
LOCATION: ANKLE

## 2020-12-21 ASSESSMENT — PAIN SCALES - GENERAL
PAINLEVEL_OUTOF10: 3
PAINLEVEL_OUTOF10: 4

## 2020-12-21 ASSESSMENT — PAIN DESCRIPTION - PAIN TYPE
TYPE: SURGICAL PAIN

## 2020-12-21 ASSESSMENT — PAIN DESCRIPTION - ORIENTATION
ORIENTATION: LEFT

## 2020-12-21 ASSESSMENT — PAIN DESCRIPTION - FREQUENCY
FREQUENCY: CONTINUOUS

## 2020-12-21 ASSESSMENT — PAIN DESCRIPTION - DESCRIPTORS
DESCRIPTORS: OTHER (COMMENT)
DESCRIPTORS: STABBING

## 2020-12-21 ASSESSMENT — PAIN DESCRIPTION - PROGRESSION
CLINICAL_PROGRESSION: NOT CHANGED
CLINICAL_PROGRESSION: NOT CHANGED

## 2020-12-21 ASSESSMENT — PAIN - FUNCTIONAL ASSESSMENT: PAIN_FUNCTIONAL_ASSESSMENT: 0-10

## 2020-12-21 NOTE — PROGRESS NOTES
1517 pt recd from [facility] to [address]. Report from Nelson rn at bedside, call light in reach. 1522 head of bed up.  Water provided  [phone] denies needs  1550 discharge instructions reviewed with patient and patients bf Dominick Givens via phone [phone], verbalized understanding  1600 assisted pt to dress  [phone] discharged to car via wheelchair

## 2020-12-21 NOTE — ANESTHESIA PROCEDURE NOTES
Peripheral Block    Patient location during procedure: pre-op  Start time: 12/21/2020 10:24 AM  End time: 12/21/2020 10:30 AM  Staffing  Performed: resident/CRNA   Anesthesiologist: Valdemar Bloom MD  Resident/CRNA: ALEK Salvador CRNA  Preanesthetic Checklist  Completed: patient identified, IV checked, site marked, risks and benefits discussed, surgical consent, monitors and equipment checked, pre-op evaluation, timeout performed, anesthesia consent given, oxygen available and patient being monitored  Peripheral Block  Patient position: supine  Prep: ChloraPrep  Patient monitoring: cardiac monitor, continuous pulse ox and IV access  Block type: Sciatic  Laterality: left  Injection technique: single-shot  Guidance: ultrasound guided  Local infiltration: lidocaine  Infiltration strength: 1 %  Dose: 1 mL  Popliteal  Provider prep: mask and sterile gloves  Local infiltration: lidocaine  Needle  Needle type: combined needle/nerve stimulator   Needle gauge: 20 G  Needle length: 8 cm  Needle localization: ultrasound guidance  Assessment  Injection assessment: negative aspiration for heme, no paresthesia on injection and local visualized surrounding nerve on ultrasound  Paresthesia pain: none  Slow fractionated injection: yes  Hemodynamics: stable  Medications Administered  Ropivacaine (NAROPIN) injection 0.5%, 20 mL  Reason for block: post-op pain management

## 2020-12-21 NOTE — ANESTHESIA POSTPROCEDURE EVALUATION
Department of Anesthesiology  Postprocedure Note    Patient: Andrea Faulkner  MRN: [id number]  YOB: 1995  Date of evaluation: 12/21/2020  Time:  2:28 PM     Procedure Summary     Date: 12/21/20 Room / Location: [address]    Anesthesia Start: 1032 Anesthesia Stop: 6684    Procedures:       LEFT ANKLE EXTERNAL FIXATOR REMOVAL (Left )      LEFT ANKLE OPEN REDUCTION INTERNAL FIXATION (Left ) Diagnosis: (LEFT ANKLE FRACTURE)    Surgeons: Erika Perez MD Responsible Provider: Lincoln Martin MD    Anesthesia Type: general ASA Status: 2          Anesthesia Type: general    Allan Phase I:      Allan Phase II:      Last vitals: Reviewed and per EMR flowsheets.        Anesthesia Post Evaluation    Patient location during evaluation: PACU  Patient participation: complete - patient participated  Level of consciousness: awake and alert  Pain score: 0  Airway patency: patent  Nausea & Vomiting: no nausea and no vomiting  Complications: no  Cardiovascular status: blood pressure returned to baseline  Respiratory status: acceptable, nasal cannula, spontaneous ventilation and nonlabored ventilation

## 2020-12-21 NOTE — H&P
ORTHOPEDIC HISTORY & PHYSICAL      2020    Patient name: Fredy Pennington  : 1995    CHIEF COMPLAINT  Left ankle fracture dislocation s/p fracture blisters and external fixation     HPI  The patient was seen and examined. Fredy Pennington is a 22 y.o. female who presents 2020 injury left ankle fx dislocation who developed 7 cm medial fracture blisters from her injury. She underwent spanning ex fixation and closed reduction of her ankle fx dislocation to stabilize her ankle while her soft tissues healed. She is brought in for staged removal of ex fix and ORIF left ankle    PAST MEDICAL HISTORY  Past Medical History:   Diagnosis Date    Asthma     Follows with PCP    Bipolar 1 disorder ([facility] [address]     Depression      premature rupture of membranes (PPROM) delivered, current hospitalization 2014     uterine contractions, antepartum 2014       CURRENT MEDICATIONS  Prior to Admission medications    Medication Sig Start Date End Date Taking? Authorizing Provider   docusate sodium (COLACE) 100 MG capsule Take 100 mg by mouth 2 times daily as needed for Constipation   Yes Historical Provider, MD   enoxaparin (LOVENOX) 40 MG/0.4ML injection Inject 0.4 mLs into the skin daily for 21 days 20  Caprice Reno PA-C   cephALEXin (KEFLEX) 500 MG capsule Take 1 capsule by mouth 3 times daily 20   Caprice Reno PA-C   ipratropium-albuterol (DUONEB) 0.5-2.5 (3) MG/3ML SOLN nebulizer solution Inhale 1 vial into the lungs every 4 hours as needed for Shortness of Breath.     Historical Provider, MD       ALLERGIES  Allergies   Allergen Reactions    Aspirin Anaphylaxis    Ibuprofen Swelling       SURGICAL HISTORY  Past Surgical History:   Procedure Laterality Date    ANKLE FRACTURE SURGERY Left 2020    LEFT ANKLE EXTERNAL FIXATION performed by Leida Rawls MD at [address] [place] [address]  Family History   Problem Relation Age of Onset    Asthma Mother        SOCIAL HISTORY  Social History     Socioeconomic History    Marital status: Single     Spouse name: None    Number of children: None    Years of education: None    Highest education level: None   Occupational History    None   Social Needs    Financial resource strain: None    Food insecurity     Worry: None     Inability: None    Transportation needs     Medical: None     Non-medical: None   Tobacco Use    Smoking status: Light Tobacco Smoker     Packs/day: 0.25     Years: 12.00     Pack years: 3.00     Types: Cigarettes     Start date: 2008    Smokeless tobacco: Never Used   Substance and Sexual Activity    Alcohol use: No    Drug use: Yes     Types: Marijuana     Comment: Last smoked: 12/1/2020    Sexual activity: Yes   Lifestyle    Physical activity     Days per week: None     Minutes per session: None    Stress: None   Relationships    Social connections     Talks on phone: None     Gets together: None     Attends Sikhism service: None     Active member of club or organization: None     Attends meetings of clubs or organizations: None     Relationship status: None    Intimate partner violence     Fear of current or ex partner: None     Emotionally abused: None     Physically abused: None     Forced sexual activity: None   Other Topics Concern    None   Social History Narrative    None       REVIEW OF SYSTEMS   Review of Systems - General ROS: negative for fever  No cough, no sob, no diarrhea, no loss of smell or taste, no dysuria, no paresthesias    PHYSICAL EXAM  VITAL SIGNS: Ht 5' 6\" (1.676 m)   Wt 239 lb (108.4 kg)   LMP 12/12/2020   BMI 38.58 kg/m²   General Appearance Alert, well appearing, No acute distress   Eyes clear   Ears, Nose, Throat clear    Neck Supple, non tender   Respiratory Lungs: clear breath sounds bilateral   Cardiovascular Heart regular rate and rythm   Gastrointestinal Abdomen: soft, non-tender, non-distended   Lymphatics No adenopathy Musculoskeletal Left ankle decompressed 7 cm medial fracture blister  Ex fix in place, mild serous drainage pins  Distal nvmi   Skin Normal. No rash or lesions   Neurological Awake, alert and oriented. No focal deficits. Motor and Sensory intact   Psychiatric Normal       LABS  No results for input(s): WBC, HEMOGLOBIN, HCT, PLT, NA, K, CL, CO2, BUN, CREATININE, CALCIUM, PHOS, ALBUMIN, MG, INR, PTT, CKMB, TROPONINI, AST, ALT, LIPASE, LACTATE, TSH in the last 72 hours. Invalid input(s): GLU, PT, CK1, TBILI, URINE    IMAGING  Left ankle trimalleolar fracture s/p ex fix    IMPRESSION:  Patient Active Problem List   Diagnosis    Labor and delivery, indication for care    Closed trimalleolar fracture of left ankle with routine healing            PLAN:  1. Removal of left ankle external fixator  2. ORIF left ankle trimalleolar fracture with fixation of the posterior malleolus  The patient was counseled at length about the risks of anton Covid-19 during their perioperative period and any recovery window from their procedure. The patient was made aware that anton Covid-19  may worsen their prognosis for recovering from their procedure  and lend to a higher morbidity and/or mortality risk. All material risks, benefits, and reasonable alternatives including postponing the procedure were discussed. The patient does wish to proceed with the procedure at this time.           Electronically signed by: Angel Kim MD, 12/21/2020 7:24 AM

## 2020-12-21 NOTE — PROGRESS NOTES
1426-pt arrived from OR and monitors applied. Report received from [facility] and [facility]. Respirations even and unlabored. Left ankle dressing dry and intact, ice packs applied. PT able to wiggle her toes and feel touch to toes. Left foot cap refill WNL and foot warm. IV infusing without difficulty. VSS. 1435- pt awakens easily to name. When asked about left ankle pain she is unable to rate it on numerical scale and states \"its just stinging\". Pt returns to sleep without intervention. Tolerating room air well. 1456- pt resting with eyes closed.  VSS  1517- pt to [facility], report given to [facility]

## 2020-12-21 NOTE — BRIEF OP NOTE
Brief Postoperative Note      Patient: Gustavo Alexis  YOB: 1995  MRN: [id number]    Date of Procedure: 12/21/2020    Pre-Op Diagnosis: LEFT ANKLE FRACTURE TRIMALLEOLAR FRACTURE WITH EXTERNAL FIXATION DEVICE    Post-Op Diagnosis: LEFT ANKLE PILON FRACTURE AND LATERAL MALLEOLUS FRACTURE       Procedure(s):  LEFT ANKLE EXTERNAL FIXATOR REMOVAL  LEFT ANKLE OPEN REDUCTION INTERNAL FIXATION LEFT ANKLE PILON AND LATERAL MALLEOLUS FRACTURES    Surgeon(s):  Jodi Jc MD    Assistant:  Jared Lisa CFA      Anesthesia: General    Estimated Blood Loss (mL): less than 418     Complications: None    Specimens:   * No specimens in log *    Implants:  Implant Name Type Inv.  Item Serial No.  Lot No. LRB No. Used Action   PLATE BNE [id number] 5 H ST L POSTEROLATERAL DST FIBULAR S STL  PLATE BNE [id number] 5 H ST L POSTEROLATERAL DST FIBULAR S STL  [employer]WD  Left 1 Implanted   SCREW BNE L14MM DIA3.5MM RACHID S STL ST NONCANNULATED KAROL  SCREW BNE L14MM DIA3.5MM RACHID S STL ST NONCANNULATED KAROL  DEPUY [employer]-WD  Left 3 Implanted   SCREW BNE L16MM DIA2.7MM RACHID S STL ST KAROL FULL THRD T8  SCREW BNE L16MM DIA2.7MM RACHID S STL ST KAROL FULL THRD T8  [employer]-WD  Left 2 Implanted   SCREW BNE L20MM DIA2.7MM RACHID S STL ST KAROL FULL THRD T8  SCREW BNE L20MM DIA2.7MM RACHID S STL ST KAROL FULL THRD T8  [employer]-WD  Left 2 Explanted   PLATE BNE [id number] 6 H ST POSTEROLATERAL DST TIB S STL T SHP  PLATE BNE [id number] 6 H ST POSTEROLATERAL DST TIB S STL T SHP  DEPUY [employer]-WD  Left 1 Implanted   SCREW BNE L18MM DIA2.7MM RACHID S STL ST KAROL FULL THRD T8  SCREW BNE L18MM DIA2.7MM RACHID S STL ST KAROL FULL THRD T8  DEPUY [employer]-WD  Left 2 Implanted   SCREW BNE L28MM DIA3.5MM RACHID S STL ST NONCANNULATED KAROL  SCREW BNE L28MM DIA3.5MM RACHID S STL ST NONCANNULATED KAROL  [employer]WD  Left 1 Implanted   SCREW BNE L44MM DIA2.7MM ANK S STL ST RONALD ANG KAROL FULL THRD  SCREW BNE L44MM DIA2.7MM ANK S STL ST RONALD ANG KAROL FULL THRD  DEPU[employer]-WD  Left 1 Implanted   SCREW BNE L18MM DIA2.7MM RACHID S STL ST FULL THRD FOR SM  SCREW BNE L18MM DIA2.7MM RACHID S STL ST FULL THRD FOR SM  DEPU[employer]WD  Left 2 Implanted   SCREW BNE L28MM DIA2.7MM RACHID S STL ST FULL THRD FOR SM  SCREW BNE L28MM DIA2.7MM RACHID S STL ST FULL THRD FOR SM  DEPUY [employer]-WD  Left 2 Implanted   SCREW BNE L20MM DIA2.7MM RACHID S STL ST FULL THRD FOR SM  SCREW BNE L20MM DIA2.7MM RACHID S STL ST FULL THRD FOR SM  DEPUY [employer]-WD  Left 2 Explanted   SCREW BNE L38MM DIA4MM S STL AGGIE SHT 1/3 THRD SM HEX SOCK  SCREW BNE L38MM DIA4MM S STL AGGIE SHT 1/3 THRD SM HEX SOCK  DEPU[employer]WD  Left 1 Implanted   SCREW BNE L30MM DIA2.7MM RACHID S STL ST FULL THRD FOR SM  SCREW BNE L30MM DIA2.7MM RACHID S STL ST FULL THRD FOR   DEPUY [employer]-WD  Left 1 Implanted   SCREW BNE L22MM DIA2.7MM RACHID S STL ST FULL THRD FOR SM  SCREW BNE L22MM DIA2.7MM RACHID S STL ST FULL THRD FOR   DEPUY [employer]-WD  Left 2 Implanted   WASHER ORTH DIA7MM FOR AGGIE SCR  WASHER ORTH DIA7MM FOR AGGIE SCR  DEPU[employer]  Left 2 Implanted   SCREW BNE L18MM DIA2.7MM ANK S STL ST RONALD ANG KAROL FULL THRD  SCREW BNE L18MM DIA2.7MM ANK S STL ST RONALD ANG KAROL FULL THRD  DEPU[employer]  Left 1 Implanted   SCREW BNE L36MM DIA2.7MM ANK S STL ST RONALD ANG KAROL FULL THRD  SCREW BNE L36MM DIA2.7MM ANK S STL ST RONALD ANG KAROL FULL THRD  DEPU[employer]WD  Left 1 Implanted         Drains: * No LDAs found *    Findings: severely comminuted medial malleolus fracture and plafond fracture with marginal impaction of distal tibia plafond and lateral malleolus fracture , cartilage  excoriations superior talus dome    Electronically signed by Simran Blanton MD on 12/21/2020 at 2:26 PM

## 2021-01-07 ENCOUNTER — OFFICE VISIT (OUTPATIENT)
Dept: ORTHOPEDIC SURGERY | Age: 26
End: 2021-01-07
Payer: COMMERCIAL

## 2021-01-07 DIAGNOSIS — S82.852D CLOSED TRIMALLEOLAR FRACTURE OF LEFT ANKLE WITH ROUTINE HEALING: Primary | ICD-10-CM

## 2021-01-07 PROCEDURE — 29405 APPL SHORT LEG CAST: CPT | Performed by: ORTHOPAEDIC SURGERY

## 2021-01-07 PROCEDURE — 99024 POSTOP FOLLOW-UP VISIT: CPT | Performed by: ORTHOPAEDIC SURGERY

## 2021-01-07 NOTE — PATIENT INSTRUCTIONS
Continue nonweightbearing  Staples removed today in office  Short leg cast reapplied  Ice and elevate as needed  Tylenol or Motrin for pain  Follow up 2 weeks with in cast xrays

## 2021-01-07 NOTE — PROGRESS NOTES
ORTHOPEDIC PROGRESS NOTE    2021    Patient name: Larry Paul  : 1995      SUBJECTIVE     Chief Complaint   Patient presents with    Fracture     Left ORIF trimall     The patient was seen and examined.   DOS/DOI: 12/3/2020 and 2020 staged ex fix for large fracture blisters followed by ORIF and removal of ex fix for pilon and lateral malleolus fractures    OBJECTIVE     GEN: A&O, NAD  MS: short leg cast removed and there is healing of both wounds and the medial sutures and posterior lateral staples were removed and steristrips placed and pt placed back in short leg cast      X-rays: ORIF left ankle pilon and lateral malleolus fractures stable  There is a vertical fx line seen that wasn't evident on preop CT    ASSESSMENT     22 y.o. female, 2 weeks post- second stagedsurgery /injury was 2020    PLAN     - Activity: Non-weight bearing left leg estimated 3 mos  - Brace: Cast; keep cast clean, dry and intact  Follow up in 2-3 weeks for xrays and skin check    Electronically signed by:Praveena Saha MD, 2021 8:31 PM

## 2021-01-28 ENCOUNTER — OFFICE VISIT (OUTPATIENT)
Dept: ORTHOPEDIC SURGERY | Age: 26
End: 2021-01-28
Payer: COMMERCIAL

## 2021-01-28 VITALS
WEIGHT: 239 LBS | RESPIRATION RATE: 16 BRPM | OXYGEN SATURATION: 97 % | BODY MASS INDEX: 38.41 KG/M2 | HEIGHT: 66 IN | HEART RATE: 64 BPM

## 2021-01-28 DIAGNOSIS — S82.872G CLOSED DISPLACED PILON FRACTURE OF LEFT TIBIA WITH DELAYED HEALING: Primary | ICD-10-CM

## 2021-01-28 DIAGNOSIS — S82.62XG: ICD-10-CM

## 2021-01-28 PROCEDURE — 29405 APPL SHORT LEG CAST: CPT | Performed by: PHYSICIAN ASSISTANT

## 2021-01-28 PROCEDURE — 99024 POSTOP FOLLOW-UP VISIT: CPT | Performed by: PHYSICIAN ASSISTANT

## 2021-01-28 NOTE — PATIENT INSTRUCTIONS
Nonweightbearing left leg   Please keep cast clean dry and intact  Follow up in 2 weeks for x-rays and skin check    Cast Application - left lower extremity:  · An appropriately padded, well molded short-leg cast is applied to the patient. The patient reports no immediate discomfort from the cast.  Cast care instructions are provided. Cast Care Instructions:  · Inspect the cast regularly. If it becomes cracked or develops soft spots, contact office. · Inspect skin around the cast regularly. If skin becomes red or raw around the cast, contact office. · Do not break off rough edges of the cast or trim the cast.  Do not modify the cast.    · Do not pull out the padding from the cast.  · Do not put foreign objects under the cast.  · Do not walk on the cast or place body weight through the cast.  · Keep dirt, sand, and powder away from the inside of the cast.  · Keep the cast clean and DRY! · Return with any cast problems.

## 2021-02-11 ENCOUNTER — OFFICE VISIT (OUTPATIENT)
Dept: ORTHOPEDIC SURGERY | Age: 26
End: 2021-02-11

## 2021-02-11 VITALS — BODY MASS INDEX: 38.41 KG/M2 | WEIGHT: 239 LBS | HEIGHT: 66 IN | RESPIRATION RATE: 16 BRPM

## 2021-02-11 DIAGNOSIS — S82.872G CLOSED DISPLACED PILON FRACTURE OF LEFT TIBIA WITH DELAYED HEALING: Primary | ICD-10-CM

## 2021-02-11 DIAGNOSIS — S82.62XG: ICD-10-CM

## 2021-02-11 PROCEDURE — 99024 POSTOP FOLLOW-UP VISIT: CPT | Performed by: PHYSICIAN ASSISTANT

## 2021-02-11 NOTE — PROGRESS NOTES
ORTHOPEDIC PROGRESS NOTE    2021    Patient name: Aries Paz  : 1995      SUBJECTIVE     Chief Complaint   Patient presents with    Fracture     Left ankle in cast xr     The patient was seen and examined. DOS/DOI: 12/3/2020 and 2020 staged ex fix for large fracture blisters followed by ORIF and removal of ex fix for pilon and lateral malleolus fractures    Reports her pain controlled. Denies paresthesias. Reports she has been doing very well with mobilizing around her home keeping nonweightbearing on the left lower extremity. 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 OBJECTIVE     GEN: A&O, NAD  MS: short leg cast removed and there is healing of both wounds. No dehiscence, no erythema, no drainage; minimal edema to foot; calf soft; achilles tight, able to get to neutral DF. SILT    X-rays: ORIF left ankle pilon and lateral malleolus fractures stable  vertical fx line still evident that wasn't seen on preop CT    ASSESSMENT     22 y.o. female, 7 weeks post second staged surgery 2020    PLAN     - Activity: Non-weight bearing left leg estimated 3 mos  - Brace: Boot Brace  - Begin range of motion exercises, dorsiflexion. Discussed that the ankle will likely be very stiff give her injury pattern and immobilization. We will work to improve range of motion with her rehab. OK to rest foot flat on ground without weightbearing.   - Follow up in 3 weeks for xrays. Contact us sooner with concerns.     Electronically signed by:Summer Massey PA-C, 2021 8:51 AM

## 2021-02-11 NOTE — PATIENT INSTRUCTIONS
- Activity: Non-weight bearing left leg estimated 3 mos  - Brace: Boot Brace, remove for hygiene and and range of motion  - Begin range of motion exercises, dorsiflexion. OK to rest foot flat on ground without weightbearing.   - Follow up in 4 weeks for xrays. Contact us sooner with concerns.

## 2021-03-10 NOTE — PROGRESS NOTES
ORTHOPEDIC PROGRESS NOTE    3/10/2021    Patient name: Jose E Cooper  : 1995      SUBJECTIVE     Chief Complaint   Patient presents with    Post-Op Check     ORIF left ankle pilon and lateral malleolus      The patient was seen and examined. DOS/DOI: 12/3/2020 and 2020 staged ex fix for large fracture blisters followed by ORIF and removal of ex fix for pilon and lateral malleolus fractures    Reports her pain controlled. Denies paresthesias. Reports she has been doing very well with mobilizing around her home keeping nonweightbearing on the left lower extremity. 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 OBJECTIVE     GEN: A&O, NAD  MS: short leg cast removed and there is healing of both wounds. No dehiscence, no erythema, no drainage; minimal edema to foot; calf soft; achilles tight, able to get to neutral DF. SILT    X-rays: ORIF left ankle pilon and lateral malleolus fractures stable  vertical fx line still evident that wasn't seen on preop CT    ASSESSMENT     22 y.o. female, 11 weeks post second staged surgery 2020    PLAN     - Activity: Non-weight bearing left leg estimated 3 mos  - Brace: Boot Brace  - Begin range of motion exercises, dorsiflexion. Discussed that the ankle will likely be very stiff give her injury pattern and immobilization. We will work to improve range of motion with her rehab. OK to rest foot flat on ground without weightbearing.   - Follow up in 4 weeks for xrays. Contact us sooner with concerns.     Electronically signed by:Summer Robertson PA-C, 3/10/2021 10:21 AM

## 2021-03-11 ENCOUNTER — OFFICE VISIT (OUTPATIENT)
Dept: ORTHOPEDIC SURGERY | Age: 26
End: 2021-03-11

## 2021-03-11 DIAGNOSIS — S82.872G CLOSED DISPLACED PILON FRACTURE OF LEFT TIBIA WITH DELAYED HEALING: Primary | ICD-10-CM

## 2021-03-11 PROCEDURE — 99024 POSTOP FOLLOW-UP VISIT: CPT | Performed by: ORTHOPAEDIC SURGERY

## 2021-03-18 ENCOUNTER — OFFICE VISIT (OUTPATIENT)
Dept: ORTHOPEDIC SURGERY | Age: 26
End: 2021-03-18

## 2021-03-18 VITALS — WEIGHT: 239 LBS | RESPIRATION RATE: 16 BRPM | BODY MASS INDEX: 38.41 KG/M2 | HEIGHT: 66 IN

## 2021-03-18 DIAGNOSIS — S82.872G CLOSED DISPLACED PILON FRACTURE OF LEFT TIBIA WITH DELAYED HEALING: Primary | ICD-10-CM

## 2021-03-18 PROCEDURE — 99024 POSTOP FOLLOW-UP VISIT: CPT | Performed by: PHYSICIAN ASSISTANT

## 2021-03-18 NOTE — PROGRESS NOTES
ORTHOPEDIC PROGRESS NOTE    3/18/2021    Patient name: Kyle Harrison  : 1995      SUBJECTIVE     Chief Complaint   Patient presents with    Fracture     Left ankle     The patient was seen and examined. DOS/DOI: 12/3/2020 and 2020 staged ex fix for large fracture blisters followed by ORIF and removal of ex fix for pilon and lateral malleolus fractures    Reports her pain controlled. Denies paresthesias. Reports she has been doing very well with mobilizing around her home keeping nonweightbearing on the left lower extremity. 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 She has been working on range of motion exercises on her own and feels she is doing well. Minimizes complaints, other than a small tender spot on the medial aspect of the ankle, distal aspectincision    OBJECTIVE     GEN: A&O, NAD  MS: Healed ankle incisions; no dehiscence, no erythema, no drainage; minimal edema to foot; calf soft; achilles tight, able to get to neutral DF, PF 20. SILT    X-rays: ORIF left ankle pilon and lateral malleolus fractures stable  vertical fx line still evident that wasn't seen on preop CT    ASSESSMENT     22 y.o. female, 12 weeks post second staged surgery 2020    PLAN     - Activity: Increase to WBAT, foot flat  - Brace: Boot Brace  - Full ROM exercises. Discussed that the ankle will likely be very stiff give her injury pattern and immobilization. We will work to improve range of motion with her rehab. - Start PT  - Follow up in 4 weeks for xrays. Contact us sooner with concerns.     Electronically signed by:Summer Hadley PA-C, 3/18/2021 9:53 AM

## 2021-03-18 NOTE — PATIENT INSTRUCTIONS
Begin to weight bear as tolerated in boot  Will order formal physical therapy- [phone]   Work on range of motion exercises  Follow up 4 weeks

## 2021-04-14 ENCOUNTER — HOSPITAL ENCOUNTER (OUTPATIENT)
Dept: PHYSICAL THERAPY | Age: 26
Setting detail: THERAPIES SERIES
Discharge: HOME | End: 2021-04-14
Payer: COMMERCIAL

## 2021-04-14 PROCEDURE — 97110 THERAPEUTIC EXERCISES: CPT

## 2021-04-14 PROCEDURE — 97162 PT EVAL MOD COMPLEX 30 MIN: CPT

## 2021-04-14 PROCEDURE — 97530 THERAPEUTIC ACTIVITIES: CPT

## 2021-04-14 NOTE — PLAN OF CARE
Outpatient Physical Therapy           [place]           [x] Phone: [phone]   Fax: [phone]  [place]           [] Phone: [phone]   Fax: [phone]     To: Referring Practitioner: Dr. Brianna Woods    From: Melissa Guerra, GABRIELE     Patient: Dorie Warner       : 1995  Diagnosis: Diagnosis: S82.872G (ICD-10-CM) - Closed displaced pilon fracture of left tibia with delayed healing   Treatment Diagnosis: Treatment Diagnosis: Closed displaced pilon fractrue of left tibia with delayed healing, orthopedic aftercare Z47.89, abnormalities of gait and mobility   Date: 2021    Physical Therapy Certification/Re-Certification Form  Dear Dr. Thor Mccloud  The following patient has been evaluated for physical therapy services and for therapy to continue, insurance requires physician review of the treatment plan initially and every 90 days. Please review the attached evaluation and/or summary of the patient's plan of care, and verify that you agree therapy should continue by signing the attached document and sending it back to our office. Assessment:    Assessment: Nava Jacobson is a 22year old female who was referred to physical therapy for treatment of a displaced pilon fracture of the left tibia. Patient underwent 2 surgeries to repair tibia and fibula fractures on 12/3/20 and 21. Objective findings include decreased ankle ROM (inversion and eversion greatly limited), decreased ankle strength, gait deficits and balance deficits. At this time, Esha Holliday is using a FWW to ambulate due to poor balance in the boot. Esha Holliday will benefit from skilled therapeutic intervention in this setting to decrease pain, improve ankle strength and mobility, and return to prior level of function.     Plan of Care/Treatment to date:  [x] Therapeutic Exercise  [x] Modalities:  [x] Therapeutic Activity     [] Ultrasound  [] Electrical Stimulation  [x] Gait Training      [] Cervical Traction [] Lumbar Traction  [x] Neuromuscular Re-education    [x] Cold/hotpack [] Iontophoresis   [x] Instruction in HEP      [] Vasopneumatic    [] Dry Needling  [x] Manual Therapy               [] Aquatic Therapy       Other:          Frequency/Duration:  # Days per week: [] 1 day # Weeks: [] 1 week [] 5 weeks     [x] 2 days   [] 2 weeks [] 6 weeks     [] 3 days   [] 3 weeks [] 7 weeks     [] 4 days   [] 4 weeks [x] 8 weeks         [] 9 weeks [] 10 weeks         [] 11 weeks [] 12 weeks    Rehab Potential/Progress: [] Excellent [x] Good [] Fair  [] Poor     Goals:      Short term goals  Time Frame for Short term goals: 4 week  Short term goal 1: Patient will walk > 100ft without using an assistive device and without an increase in pain. Short term goal 2: Patient will demonstrate improved ankle PROM by 5 degrees in all directions. Short term goal 3: Patient will present with improved score on the LEFS from 37/80 to 47/80. Long term goals  Time Frame for Long term goals : 8 weeks  Long term goal 1: Patient will report improved ability to ambulate >5 minutes without using an assistive device or without the boot in order to improve community ambulation. Long term goal 2: Patient will present with an improved score on the LEFS from 37/80 to 60/80. Electronically signed by:  Catalina Grider PT, 4/14/2021, 5:32 PM        If you have any questions or concerns, please don't hesitate to call.   Thank you for your referral.      Physician Signature:________________________________Date:_________ TIME: _____  By signing above, therapists plan is approved by physician

## 2021-04-14 NOTE — PROGRESS NOTES
Physical Therapy  Initial Assessment  Date: 2021  Patient Name: Arina Hammond  MRN: [id number]  : 1995     Treatment Diagnosis: Closed displaced pilon fractrue of left tibia with delayed healing, orthopedic aftercare Z47.89, abnormalities of gait and mobility    Restrictions   Pt in boot, WBAT at this time . Wait for further clearance from MD (next travis 4/15/21)  No joint mobes until fractures are completely healed. PROM and STM are ok. Subjective   General  Chart Reviewed: Yes  Additional Pertinent Hx: DOS: 12/3/2020 and 2020 staged ex fix for large fracture blisters followed by ORIF and removal of ex fix for tiba pilon and lateral malleolus fractures  Referring Practitioner: Dr. Shay Bernal  Referral Date : 21  Diagnosis: S82.872G (ICD-10-CM) - Closed displaced pilon fracture of left tibia with delayed healing  Other (Comment): Begin to weight bear as tolerated in boot. Full ankle ROM exercises. General Comment  Comments: 0/10 pain in the L ankle    4/10 pain in the heel when walking  PT Visit Information  Onset Date: 21  PT Insurance Information: [email or web address]- [email or web address] PCY  Progress Note Counter: 10th visit  Subjective  Subjective: Patient reports that she has been walking short distances (down the hallway and back) without using her walker. However, she continues to use the walker for walking longer distances. She reports it is difficult walking in the boot due to different height compared to the other foot. Patient reports that she has been completing ROM activites at home and they are going well. Orientation  Orientation  Overall Orientation Status: Within Normal Limits    Social/Functional History  Social/Functional History  Lives With: Family; Other (comment)(Lives with children.  Best friend does live next door.)  Type of Home: House  Home Layout: One level  Home Access: Stairs to enter without rails(2 stairs)  Home Equipment: Rolling walker;Crutches(Knee shelly)  Receives Help From: Friend(s)  ADL Assistance: Independent  Homemaking Assistance: Needs assistance(Needs assistance with getting clothes out of the dryer and mopping. Able to do dishes independently.)  Ambulation Assistance: Needs assistance(Needs assist from [facility])  Transfer Assistance: Independent  Active : Yes  Mode of Transportation: Car  Occupation: Works at home  Type of occupation: Working from home, sitting at computer most of the day  Leisure & Hobbies: Melquiades michelle  Additional Comments: PLOF: Prior to injury, pt was mobile without an assistive device, active, melquiades hooping    CLOF: Walking in a boot using a FWW, needs assistance with some household activities    Objective     Observation/Palpation  Posture: Good  Palpation: Some tenderness with palpation of medial ankle along scar. Observation: Patient arrives to today's appointment ambulating in a boot using a FWW  Edema: Swelling noted in the L ankle. Girth measurement from malleoli to malleoli   R: 9.75 inches    L:  11 inches  Scar: 2 large incisions along medial (tibia) and lateral (fibula) ankle. 3 small incisions along shin. Some scabbing/discoloration along lateral scar on fibula. PROM LLE (degrees)  LLE PROM: Exceptions  L Ankle Dorsiflexion 0-20: 6  L Ankle Plantar Flexion 0-45: 32  L Ankle Forefoot Inversion 0-40: 15  L Ankle Forefoot Eversion 0-20: 10  AROM LLE (degrees)  LLE AROM : Exceptions  L Ankle Dorsiflexion 0-20: 3  L Ankle Plantar Flexion 0-45: 22  L Ankle Forefoot Inversion 0-40: 6  L Ankle Forefoot Eversion 0-20: 5          Additional Measures  Other: Gait: Patient is able to ambulate 15ft donning boot on LLE without the FWW, however gait is quite unsteady. Patient given a SPC, which did improve gait pattern and balance.       Standing balance: Patient is able to stand > 1 minute without a loss of balance  Sensation  Overall Sensation Status: Impaired(Abnormal sensation in the L foot (dorsal surface) and ankle)             Assessment   Conditions Requiring Skilled Therapeutic Intervention  Body structures, Functions, Activity limitations: Decreased ROM; Decreased strength;Decreased functional mobility ; Increased pain;Decreased balance  Assessment: Macy Gonzalez is a 22year old female who was referred to physical therapy for treatment of a displaced pilon fracture of the left tibia. Patient underwent 2 surgeries to repair tibia and fibula fractures on 12/3/20 and 12/21/21. Objective findings include decreased ankle ROM (inversion and eversion greatly limited), decreased ankle strength, gait deficits and balance deficits. At this time, Marin Hernandez is using a FWW to ambulate due to poor balance in the boot. Marin Hernandez will benefit from skilled therapeutic intervention in this setting to decrease pain, improve ankle strength and mobility, and return to prior level of function.   Treatment Diagnosis: Closed displaced pilon fractrue of left tibia with delayed healing, orthopedic aftercare Z47.89, abnormalities of gait and mobility  Prognosis: Good  Decision Making: Medium Complexity  History: Hx of multiple surgeries, fractures with delayed healing, pt donning boot and using an assistive device  Exam: AROM, PROM, gait, balance, sensation, edema  Barriers to Learning: no  Activity Tolerance  Activity Tolerance: Patient Tolerated treatment well         Plan   Plan  Times per week: 2x  Plan weeks: 8 weeks  Current Treatment Recommendations: Strengthening, Gait Training, Modalities, Balance Training, Manual Therapy - Soft Tissue Mobilization, ROM, Home Exercise Program, Neuromuscular Re-education, ADL/Self-care Training, Pain Management  Plan Comment: Plan to progress PROM (manual therapy, no joint mobes until fractures are healed and clearance from MD) , STM,  AAROM activities, WBAT in // bars, gait training using SPC      OutComes Score   LEFS  37/80      Goals  Short term goals  Time Frame for Short term goals: 4 week  Short term goal 1: Patient will walk > 100ft without using an assistive device and without an increase in pain. Short term goal 2: Patient will demonstrate improved ankle PROM by 5 degrees in all directions. Short term goal 3: Patient will present with improved score on the LEFS from 37/80 to 47/80. Long term goals  Time Frame for Long term goals : 8 weeks  Long term goal 1: Patient will report improved ability to ambulate >5 minutes without using an assistive device or without the boot in order to improve community ambulation. Long term goal 2: Patient will present with an improved score on the LEFS from 37/80 to 60/80.   Patient Goals   Patient goals : Walk without a device       Therapy Time   Individual Concurrent Group Co-treatment   Time In 0936         Time Out 1030         Minutes 54         Timed Code Treatment Minutes: Emile 83, PT

## 2021-04-14 NOTE — FLOWSHEET NOTE
compression sock to decrease edema. Home Exercise Program:  Created and reviewed      Manual Treatments:  Next- PROM ankle , STM to tissues, cross friction massage to scars      Modalities:  Next-   Vaso to ankle      Communication with other providers:  POC sent for cosign      Assessment:  (Response towards treatment session) (Pain Rating)    Assessment: Sruthi Natarajan is a 22year old female who was referred to physical therapy for treatment of a displaced pilon fracture of the left tibia. Patient underwent 2 surgeries to repair tibia and fibula fractures on 12/3/20 and 12/21/21. Objective findings include decreased ankle ROM (inversion and eversion greatly limited), decreased ankle strength, gait deficits and balance deficits. At this time, Parish Pinzon is using a FWW to ambulate due to poor balance in the boot. Parish Pinzon will benefit from skilled therapeutic intervention in this setting to decrease pain, improve ankle strength and mobility, and return to prior level of function.       Plan for Next Session:        Time In / Time Out:    [id number]       If [facility] Please Indicate Time In/Out/Total Time  CPT Code Time in Time out Total Time                                                            Total for session             Timed Code/Total Treatment Minutes:  54'/54'  Eval 30', There ex 10', There act/education: 15'      Next Progress Note due:  10th visit      Plan of Care Interventions:  [x] Therapeutic Exercise  [x] Modalities:  [x] Therapeutic Activity     [] Ultrasound  [] Estim  [x] Gait Training      [] Cervical Traction [] Lumbar Traction  [x] Neuromuscular Re-education    [x] Cold/hotpack [] Iontophoresis   [x] Instruction in HEP      [x] Vasopneumatic   [] Dry Needling    [x] Manual Therapy               [] Aquatic Therapy              Electronically signed by:  Diane Stevenson, 4/14/2021, 5:33 PM

## 2021-04-15 ENCOUNTER — OFFICE VISIT (OUTPATIENT)
Dept: ORTHOPEDIC SURGERY | Age: 26
End: 2021-04-15
Payer: COMMERCIAL

## 2021-04-15 VITALS — WEIGHT: 222 LBS | BODY MASS INDEX: 35.68 KG/M2 | RESPIRATION RATE: 16 BRPM | HEIGHT: 66 IN

## 2021-04-15 DIAGNOSIS — S82.872G CLOSED DISPLACED PILON FRACTURE OF LEFT TIBIA WITH DELAYED HEALING: Primary | ICD-10-CM

## 2021-04-15 DIAGNOSIS — S82.62XG: ICD-10-CM

## 2021-04-15 PROCEDURE — G8417 CALC BMI ABV UP PARAM F/U: HCPCS | Performed by: PHYSICIAN ASSISTANT

## 2021-04-15 PROCEDURE — G8427 DOCREV CUR MEDS BY ELIG CLIN: HCPCS | Performed by: PHYSICIAN ASSISTANT

## 2021-04-15 PROCEDURE — 99213 OFFICE O/P EST LOW 20 MIN: CPT | Performed by: PHYSICIAN ASSISTANT

## 2021-04-15 PROCEDURE — 4004F PT TOBACCO SCREEN RCVD TLK: CPT | Performed by: PHYSICIAN ASSISTANT

## 2021-04-15 NOTE — PATIENT INSTRUCTIONS
Continue to weight bear as tolerated  Continue range of motion  Continue physical therapy  Ice and elevate as needed  Tylenol or Motrin for pain  Fitted for lace up ankle brace  Follow up 2 months

## 2021-04-28 ENCOUNTER — HOSPITAL ENCOUNTER (OUTPATIENT)
Dept: PHYSICAL THERAPY | Age: 26
Setting detail: THERAPIES SERIES
Discharge: HOME | End: 2021-04-28
Payer: COMMERCIAL

## 2021-04-28 PROCEDURE — 97110 THERAPEUTIC EXERCISES: CPT

## 2021-04-28 PROCEDURE — 97016 VASOPNEUMATIC DEVICE THERAPY: CPT

## 2021-04-28 PROCEDURE — 97140 MANUAL THERAPY 1/> REGIONS: CPT

## 2021-04-28 PROCEDURE — 97116 GAIT TRAINING THERAPY: CPT

## 2021-04-28 NOTE — FLOWSHEET NOTE
Outpatient Physical Therapy  [place]           [x] Phone: [phone]   Fax: [phone]  [place]           [] Phone: [phone]   Fax: [phone]        Physical Therapy Daily Treatment Note  Date:  2021    Patient Name:  Betina Live    :  1995  MRN: [id number]  Restrictions/Precautions:    Pt in boot, WBAT at this time . Wait for further clearance from MD (next travis 4/15/21)  No joint mobes until fractures are completely healed. Be on lookout for updated script coming 4/15/21. PROM and STM are ok. Diagnosis:   Diagnosis: S82.872G (ICD-10-CM) - Closed displaced pilon fracture of left tibia with delayed healing  Date of Injury/Surgery: 12/3 and   Treatment Diagnosis: Treatment Diagnosis: Closed displaced pilon fractrue of left tibia with delayed healing, orthopedic aftercare Z47.89, abnormalities of gait and mobility    Insurance/Certification information: PT Insurance Information: [address]   Referring Physician:  Referring Practitioner: Dr. Lori Delaney  Next Doctor Visit:    Plan of care signed (Y/N):  pending  Outcome Measure: LEFS 37/80  Visit# / total visits:     Pain level: 3/10      Goals:       Short term goals  Time Frame for Short term goals: 4 week  Short term goal 1: Patient will walk > 100ft without using an assistive device and without an increase in pain. Short term goal 2: Patient will demonstrate improved ankle PROM by 5 degrees in all directions. Short term goal 3: Patient will present with improved score on the LEFS from 37/80 to 47/80. Long term goals  Time Frame for Long term goals : 8 weeks  Long term goal 1: Patient will report improved ability to ambulate >5 minutes without using an assistive device or without the boot in order to improve community ambulation. Long term goal 2: Patient will present with an improved score on the LEFS from 37/80 to 60/80.     Summary of Evaluation: Assessment: Shorty shafer 22year old female who was referred to physical therapy for treatment of a displaced pilon fracture of the left tibia. Patient underwent 2 surgeries to repair tibia and fibula fractures on 12/3/20 and 12/21/21. Objective findings include decreased ankle ROM (inversion and eversion greatly limited), decreased ankle strength, gait deficits and balance deficits. At this time, Carmen York is using a FWW to ambulate due to poor balance in the boot. Carmen York will benefit from skilled therapeutic intervention in this setting to decrease pain, improve ankle strength and mobility, and return to prior level of function. Subjective:  Carmen York reports to therapy no longer wearing a boot and using bilateral crutches. She states she is occasionally using a walker and learning to use a SPC. She reports that her surgeon gave her the okay to walk without an AD. X-rays were taken and everything is healed and she has been cleared to do anything she is comfortable with. Any changes in Ambulatory Summary Sheet? None        Objective:    Prior to today's treatment session, patient was screened for signs and symptoms related to COVID-19 including but not limited to verbally answering questions related to feeling ill, cough, or SOB, along with taking temperature via forehead thermometer. Patient presented with all negative signs and symptoms and had no fever >100 degrees Fahrenheit this date. Swelling present in L ankle with bruising. Verbal cues provided for maintaining upright posture during weight shifting in parallel bars. Unable to put her heel flat on nu-step due to lack of DF ROM.     Exercises: (No more than 4 columns)   Exercise/Equipment 4/14/21 #1 4/28/21 #2 Date           WARM UP         Nustep   S8,L3,A7,5'         TABLE      Ankle AROM  -Inversion and eversion x10  10x2   Gastroc stretch with towel X2, 30\" hold  2x, 30\" hold   Ankle dorsiflexion supine x20  x20 sitting   Ankle plantarflexion supine x20  x20 sitting   Ankle DF slide on slide board   x10 in sitting            STANDING      WBAT in // bars next     Weight shifting in // bars next  Anterior/post. and lateral1' ea   Retro step in // bars (tandem)   1' ea         Gait training w/SPC and single crutch   72' w/each AD                    PROPRIOCEPTION                                    MODALITIES      vaso   10'             Other Therapeutic Activities/Education:    Gait training reviewed using SPC . Discussed need for additional stability using SPC until balance improves and out of the boot. Reviewed pain management techniques using ice. Discussed techniques to decrease swelling including icing, compression, and elevation following activities. Discussed compression sock to decrease edema. Home Exercise Program:  Created and reviewed      Manual Treatments:  PROM ankle , STM to tissues, cross friction massage to scars      Modalities:  Vaso to ankle with patient sitting. 34 degrees low pressure. 1 pillow under L LE from knee to foot. Communication with other providers:  POC sent for cosign      Assessment:  Missy tolerated today's treatment with no adverse reactions. She demonstrated antalgic gait with AD training prior to vaso. She demonstrates limited ROM in her L ankle and is hesitant to bear weight through her LE. She will continue to benefit from skilled intervention to improve ROM and strength. End of session pain: 7-8/10 with gait training    Plan for Next Session: Continue to progress exercises as tolerated.       Time In / Time Out:    1539/1641        Timed Code/Total Treatment Minutes:  52'/62' 1 MT (18'), 1 gait (19'), 1 TE (15'), 1 vaso (10')    Next Progress Note due:  10th visit      Plan of Care Interventions:  [x] Therapeutic Exercise  [x] Modalities:  [x] Therapeutic Activity     [] Ultrasound  [] Estim  [x] Gait Training      [] Cervical Traction [] Lumbar Traction  [x] Neuromuscular Re-education    [x] Cold/hotpack [] Iontophoresis   [x] Instruction in HEP      [x] Vasopneumatic   [] Dry Needling    [x] Manual Therapy               [] Aquatic Therapy              Electronically signed by:  Amrik Castro     4/28/2021, 8:20 AM      GE[facility] Mike

## 2021-04-30 ENCOUNTER — HOSPITAL ENCOUNTER (OUTPATIENT)
Dept: PHYSICAL THERAPY | Age: 26
Setting detail: THERAPIES SERIES
Discharge: HOME | End: 2021-04-30
Payer: COMMERCIAL

## 2021-04-30 PROCEDURE — 97110 THERAPEUTIC EXERCISES: CPT

## 2021-04-30 PROCEDURE — 97140 MANUAL THERAPY 1/> REGIONS: CPT

## 2021-04-30 PROCEDURE — 97530 THERAPEUTIC ACTIVITIES: CPT

## 2021-04-30 PROCEDURE — 97016 VASOPNEUMATIC DEVICE THERAPY: CPT

## 2021-04-30 NOTE — FLOWSHEET NOTE
Outpatient Physical Therapy  [place]           [x] Phone: [phone]   Fax: [phone]  [place] [facility]           [] Phone: [phone]   Fax: [phone]        Physical Therapy Daily Treatment Note  Date:  2021    Patient Name:  Rey Martínez    :  1995  MRN: [id number]  Restrictions/Precautions:    Pt in boot, WBAT at this time . Wait for further clearance from MD (next travis 4/15/21)  No joint mobes until fractures are completely healed. Be on lookout for updated script coming 4/15/21. PROM and STM are ok. Diagnosis:   Diagnosis: S82.872G (ICD-10-CM) - Closed displaced pilon fracture of left tibia with delayed healing  Date of Injury/Surgery: 12/3 and   Treatment Diagnosis: Treatment Diagnosis: Closed displaced pilon fractrue of left tibia with delayed healing, orthopedic aftercare Z47.89, abnormalities of gait and mobility    Insurance/Certification information: PT Insurance Information: [address]   Referring Physician:  Referring Practitioner: Dr. Shelby Howard  Next Doctor Visit:    Plan of care signed (Y/N):  yes  Outcome Measure: LEFS 37/80  Visit# / total visits:   3/16  Pain level: 2/10      Goals:       Short term goals  Time Frame for Short term goals: 4 week  Short term goal 1: Patient will walk > 100ft without using an assistive device and without an increase in pain. Short term goal 2: Patient will demonstrate improved ankle PROM by 5 degrees in all directions. Short term goal 3: Patient will present with improved score on the LEFS from 37/80 to 47/80. Long term goals  Time Frame for Long term goals : 8 weeks  Long term goal 1: Patient will report improved ability to ambulate >5 minutes without using an assistive device or without the boot in order to improve community ambulation. Long term goal 2: Patient will present with an improved score on the LEFS from 37/80 to 60/80.     Summary of Evaluation: Assessment: Gabbie Weston is a 22 year old female who was referred to physical therapy for treatment of a displaced pilon fracture of the left tibia. Patient underwent 2 surgeries to repair tibia and fibula fractures on 12/3/20 and 12/21/21. Objective findings include decreased ankle ROM (inversion and eversion greatly limited), decreased ankle strength, gait deficits and balance deficits. At this time, Bay Adams is using a FWW to ambulate due to poor balance in the boot. Bay Adams will benefit from skilled therapeutic intervention in this setting to decrease pain, improve ankle strength and mobility, and return to prior level of function. Subjective:  Bay Adams reports that walking out of the boot has been feeling good. However, she still needs crutches to walk. Any changes in Ambulatory Summary Sheet? None        Objective:    Prior to today's treatment session, patient was screened for signs and symptoms related to COVID-19 including but not limited to verbally answering questions related to feeling ill, cough, or SOB, along with taking temperature via forehead thermometer. Patient presented with all negative signs and symptoms and had no fever >100 degrees Fahrenheit this date.      4/30/21: Patient with improving ankle inversion and eversion following manual        Exercises: (No more than 4 columns)   Exercise/Equipment 4/14/21 #1  4/28/21 #2 4/30/21 #3            WARM UP          Nustep   K3,Z4,Z0,7' S8,L3,A7,5'          TABLE       Ankle AROM  -Inversion and eversion x10  10x2 10x2   Gastroc stretch with towel X2, 30\" hold  2x, 30\" hold 2x, 30\" hold   Ankle dorsiflexion supine x20  x20 sitting x20 sitting  (added RTB)   Ankle plantarflexion supine x20  x20 sitting x20 sitting  (added RTB)   Ankle DF slide on slide board   x10 in sitting x10 in sitting             STANDING       WBAT in // bars next      Weight shifting in // bars next  Anterior/post. and lateral1' ea Anterior/post. and lateral1' ea   Retro step in // bars (tandem)   1' ea           Gait training w/SPC and single crutch   72' w/each AD 30' with crutch  Cueing for heel to toe gait pattern                      PROPRIOCEPTION                                          MODALITIES       vaso   10' 10'              Other Therapeutic Activities/Education:    Gait training reviewed using SPC . Discussed need for additional stability using SPC until balance improves and out of the boot. Reviewed pain management techniques using ice. Discussed techniques to decrease swelling including icing, compression, and elevation following activities. Discussed compression sock to decrease edema. Home Exercise Program:  Created and reviewed      Manual Treatments:  PROM ankle , STM to tissues, cross friction massage to scars. Gentle glenohumeral joint mobes I-II (A-P)      Modalities:  Vaso to ankle with patient sitting. 34 degrees low pressure. 1 pillow under L LE from knee to foot. Communication with other providers:  POC sent for cosign      Assessment:  Missy tolerated today's treatment with no adverse reactions. Hemant Manual with improved ankle inversion and eversion following manual techniques. Hemant Manual given a resistance band to add into HEP at home for ankle dorsi/plantarflexion for strengthening. Patient encouraged to practice a heel to toe gait pattern during ambulation. Also discussed footwear for additional support. Pt willl continue to benefit from skilled intervention to improve ROM and strength. End of session pain: 7-8/10 with gait training    Plan for Next Session: Continue to progress exercises as tolerated.       Time In / Time Out:    805-085        Timed Code/Total Treatment Minutes: 40'/50'    Manual 15', There ex  15', There act 10', vaso 10'      Next Progress Note due:  10th visit      Plan of Care Interventions:  [x] Therapeutic Exercise  [x] Modalities:  [x] Therapeutic Activity     [] Ultrasound  [] Estim  [x] Gait Training      [] Cervical Traction [] Lumbar Traction  [x] Neuromuscular Re-education    [x] Cold/hotpack [] Iontophoresis   [x] Instruction in HEP      [x] Vasopneumatic   [] Dry Needling    [x] Manual Therapy               [] Aquatic Therapy              Electronically signed by:  Sylvia Rosales       4/30/2021, 8:06 AM

## 2021-05-07 ENCOUNTER — HOSPITAL ENCOUNTER (OUTPATIENT)
Dept: PHYSICAL THERAPY | Age: 26
Setting detail: THERAPIES SERIES
Discharge: HOME | End: 2021-05-07
Payer: COMMERCIAL

## 2021-05-07 PROCEDURE — 97110 THERAPEUTIC EXERCISES: CPT

## 2021-05-07 PROCEDURE — 97016 VASOPNEUMATIC DEVICE THERAPY: CPT

## 2021-05-07 PROCEDURE — 97530 THERAPEUTIC ACTIVITIES: CPT

## 2021-05-07 NOTE — FLOWSHEET NOTE
year old female who was referred to physical therapy for treatment of a displaced pilon fracture of the left tibia. Patient underwent 2 surgeries to repair tibia and fibula fractures on 12/3/20 and 12/21/21. Objective findings include decreased ankle ROM (inversion and eversion greatly limited), decreased ankle strength, gait deficits and balance deficits. At this time, Dane Keita is using a FWW to ambulate due to poor balance in the boot. Dane Keita will benefit from skilled therapeutic intervention in this setting to decrease pain, improve ankle strength and mobility, and return to prior level of function. Subjective:  Pt arrives to tx session with 1-2/10 pain. States that she found a pair of tennis shoes (currently wearing) that have good support. States that she walked for a short distance in her house yesterday with no AD without realizing it. Any changes in Ambulatory Summary Sheet? None        Objective:    Prior to today's treatment session, patient was screened for signs and symptoms related to COVID-19 including but not limited to verbally answering questions related to feeling ill, cough, or SOB, along with taking temperature via forehead thermometer. Patient presented with all negative signs and symptoms and had no fever >100 degrees Fahrenheit this date.      4/30/21: Patient with improving ankle inversion and eversion following manual      Exercises: (No more than 4 columns)   Exercise/Equipment 4/28/21 #2 4/30/21 #3 5/7/21 #4           WARM UP         Nustep S1,U8,D4,0' S8,L3,A7,5' S8,L3,A7,5'         TABLE      Ankle AROM  -Inversion and eversion 10x2 10x2 10x2   Gastroc stretch with towel 2x, 30\" hold 2x, 30\" hold 3x  30\" hold    Ankle dorsiflexion supine x20 sitting x20 sitting  (added RTB) x20   Ankle plantarflexion supine x20 sitting x20 sitting  (added RTB) x20   Ankle DF slide on slide board x10 in sitting x10 in sitting 2x10            STANDING      WBAT in // bars      Weight shifting in // bars Anterior/post. and lateral1' ea Anterior/post. and lateral1' ea Anterior/post. and lateral1' ea x2   Retro step in // bars (tandem) 1' ea  1' ea         Gait training w/SPC and single crutch 72' w/each AD 30' with crutch  Cueing for heel to toe gait pattern 30' with crutch  VC's for heel/toe gait                    PROPRIOCEPTION                                    MODALITIES      vaso 10' 10' 10'             Other Therapeutic Activities/Education:    Gait training reviewed using SPC . Discussed need for additional stability using SPC until balance improves and out of the boot. Reviewed pain management techniques using ice. Discussed techniques to decrease swelling including icing, compression, and elevation following activities. Discussed compression sock to decrease edema. Home Exercise Program:  Created and reviewed      Manual Treatments:        Modalities:  Vaso to ankle with patient sitting. 34 degrees low pressure. 1 pillow under L LE from knee to foot. Communication with other providers:  POC sent for cosign      Assessment:  Missy tolerated today's treatment with no adverse reactions. Patient encouraged to practice a heel to toe gait pattern during ambulation. Pt willl continue to benefit from skilled intervention to improve ROM and strength. End of session pain: 1-2/10      Plan for Next Session: Continue to progress exercises as tolerated.       Time In / Time Out:    1600 / 1645        Timed Code/Total Treatment Minutes:  28' / 39'  1 TE  1 TA  1 Vaso      Next Progress Note due:  10th visit      Plan of Care Interventions:  [x] Therapeutic Exercise  [x] Modalities:  [x] Therapeutic Activity     [] Ultrasound  [] Estim  [x] Gait Training      [] Cervical Traction [] Lumbar Traction  [x] Neuromuscular Re-education    [x] Cold/hotpack [] Iontophoresis   [x] Instruction in HEP      [x] Vasopneumatic   [] Dry Needling    [x] Manual Therapy               [] Aquatic Therapy Electronically signed by:  Corrinne Olp, PTA       5/7/2021, 4:00 PM

## 2021-05-10 ENCOUNTER — HOSPITAL ENCOUNTER (OUTPATIENT)
Dept: PHYSICAL THERAPY | Age: 26
Discharge: HOME | End: 2021-05-10

## 2021-06-17 ENCOUNTER — OFFICE VISIT (OUTPATIENT)
Dept: ORTHOPEDIC SURGERY | Age: 26
End: 2021-06-17
Payer: COMMERCIAL

## 2021-06-17 VITALS — HEIGHT: 66 IN | HEART RATE: 74 BPM | WEIGHT: 222 LBS | BODY MASS INDEX: 35.68 KG/M2 | OXYGEN SATURATION: 98 %

## 2021-06-17 DIAGNOSIS — T14.8XXA FRACTURE: Primary | ICD-10-CM

## 2021-06-17 PROCEDURE — G8417 CALC BMI ABV UP PARAM F/U: HCPCS | Performed by: ORTHOPAEDIC SURGERY

## 2021-06-17 PROCEDURE — G8427 DOCREV CUR MEDS BY ELIG CLIN: HCPCS | Performed by: ORTHOPAEDIC SURGERY

## 2021-06-17 PROCEDURE — 99213 OFFICE O/P EST LOW 20 MIN: CPT | Performed by: ORTHOPAEDIC SURGERY

## 2021-06-17 PROCEDURE — 4004F PT TOBACCO SCREEN RCVD TLK: CPT | Performed by: ORTHOPAEDIC SURGERY

## 2021-06-17 NOTE — PROGRESS NOTES
ORTHOPEDIC PROGRESS NOTE    2021    Patient name: Jacey Tafoya  : 1995      SUBJECTIVE     Chief Complaint   Patient presents with    Follow-up     Left ankle     The patient was seen and examined. DOS/DOI: 12/3/2020 and 2020 staged ex fix for large fracture blisters followed by ORIF and removal of ex fix for pilon and lateral malleolus fractures    Fully weight bearing   In flip flop sandles today  Still has some limp  No pain    OBJECTIVE     GEN: A&O, NAD  MS: Healed ankle incisions; no dehiscence, no erythema, no drainage; small scab still posterior lateral incision; minimal edema to foot; calf soft; achilles tight, able to get to neutral DF, PF 30. SILT  Able to walk independently  rom symmetric to other ankle  No crepitus    X-rays;. Reviewed and interpreted: ORIF left ankle pilon and lateral malleolus fractures healed    ASSESSMENT     22 y.o. female, healed pilon and lateral mall fxs    PLAN     - wbat   Follow up prn.     Electronically signed by:Praveena Guerra MD, 2021 9:43 AM

## 2021-07-06 NOTE — ANESTHESIA POSTPROCEDURE EVALUATION
Department of Anesthesiology  Postprocedure Note    Patient: Molina Apple  MRN: [id number]  YOB: 1995  Date of evaluation: 12/11/2020  Time:  2:30 PM     Procedure Summary     Date:  12/02/20 Room / Location:  [address] [place] [address]    Anesthesia Start:  0840 Anesthesia Stop:  1000    Procedure:  LEFT ANKLE EXTERNAL FIXATION (Left Ankle) Diagnosis:  (left ankle fracture)    Surgeon:  Stephanie Whitfield MD Responsible Provider:  ALEK Maradiaga CRNA    Anesthesia Type:  general, regional, MAC ASA Status:  2 - Emergent          Anesthesia Type: No value filed. Allan Phase I: Allan Score: 9    Allan Phase II:      Last vitals: Reviewed and per EMR flowsheets.        Anesthesia Post Evaluation    Patient location during evaluation: PACU  Patient participation: complete - patient participated  Level of consciousness: sleepy but conscious  Pain score: 1  Airway patency: patent  Nausea & Vomiting: no nausea and no vomiting  Complications: no  Cardiovascular status: hemodynamically stable  Respiratory status: acceptable  Hydration status: euvolemic
wnl

## 2024-01-10 ENCOUNTER — OFFICE VISIT (OUTPATIENT)
Dept: OBGYN | Age: 29
End: 2024-01-10
Payer: COMMERCIAL

## 2024-01-10 ENCOUNTER — HOSPITAL ENCOUNTER (OUTPATIENT)
Age: 29
Setting detail: SPECIMEN
Discharge: HOME | End: 2024-01-10
Payer: COMMERCIAL

## 2024-01-10 VITALS
BODY MASS INDEX: 38.3 KG/M2 | SYSTOLIC BLOOD PRESSURE: 117 MMHG | WEIGHT: 244 LBS | HEIGHT: 67 IN | DIASTOLIC BLOOD PRESSURE: 84 MMHG | RESPIRATION RATE: 18 BRPM

## 2024-01-10 DIAGNOSIS — N92.1 MENORRHAGIA WITH IRREGULAR CYCLE: ICD-10-CM

## 2024-01-10 DIAGNOSIS — N94.6 DYSMENORRHEA: Primary | ICD-10-CM

## 2024-01-10 DIAGNOSIS — Z01.419 WELL WOMAN EXAM WITH ROUTINE GYNECOLOGICAL EXAM: ICD-10-CM

## 2024-01-10 PROCEDURE — 99385 PREV VISIT NEW AGE 18-39: CPT | Performed by: OBSTETRICS & GYNECOLOGY

## 2024-01-10 PROCEDURE — 88142 CYTOPATH C/V THIN LAYER: CPT

## 2024-01-10 PROCEDURE — 87624 HPV HI-RISK TYP POOLED RSLT: CPT

## 2024-01-10 PROCEDURE — G8484 FLU IMMUNIZE NO ADMIN: HCPCS | Performed by: OBSTETRICS & GYNECOLOGY

## 2024-01-10 PROCEDURE — 87801 DETECT AGNT MULT DNA AMPLI: CPT

## 2024-01-10 SDOH — ECONOMIC STABILITY: FOOD INSECURITY: WITHIN THE PAST 12 MONTHS, YOU WORRIED THAT YOUR FOOD WOULD RUN OUT BEFORE YOU GOT MONEY TO BUY MORE.: NEVER TRUE

## 2024-01-10 SDOH — ECONOMIC STABILITY: FOOD INSECURITY: WITHIN THE PAST 12 MONTHS, THE FOOD YOU BOUGHT JUST DIDN'T LAST AND YOU DIDN'T HAVE MONEY TO GET MORE.: NEVER TRUE

## 2024-01-10 SDOH — ECONOMIC STABILITY: HOUSING INSECURITY
IN THE LAST 12 MONTHS, WAS THERE A TIME WHEN YOU DID NOT HAVE A STEADY PLACE TO SLEEP OR SLEPT IN A SHELTER (INCLUDING NOW)?: NO

## 2024-01-10 SDOH — ECONOMIC STABILITY: INCOME INSECURITY: HOW HARD IS IT FOR YOU TO PAY FOR THE VERY BASICS LIKE FOOD, HOUSING, MEDICAL CARE, AND HEATING?: NOT HARD AT ALL

## 2024-01-10 ASSESSMENT — PATIENT HEALTH QUESTIONNAIRE - PHQ9
2. FEELING DOWN, DEPRESSED OR HOPELESS: 0
SUM OF ALL RESPONSES TO PHQ QUESTIONS 1-9: 0
SUM OF ALL RESPONSES TO PHQ9 QUESTIONS 1 & 2: 0
1. LITTLE INTEREST OR PLEASURE IN DOING THINGS: 0
SUM OF ALL RESPONSES TO PHQ QUESTIONS 1-9: 0

## 2024-01-11 NOTE — PROGRESS NOTES
24    Missy RosasKattyCaitlyn  1995    Chief Complaint   Patient presents with    New Patient     Pt here for new gyn. Pap 2016-neg, LMP 23-reg, has IUD, sexually active, still has ovaries. Pt reports that she had non hormonal IUD placed in  and recently had miscarriage in Oct. Wanting to make sure IUD is still effective.     Dysmenorrhea     C/o painful menses. Pt reports pain radiates to back. Rates pain 8-9/10 at its worst. States that she started tracking menses and eating better which has helped with pain.     Menorrhagia     C/o heavy menses. States that she has to change her super tampon every 1 hour during her menses.         Missy James is a 28 y.o. female who presents today for evaluation of see above.    Past Medical History:   Diagnosis Date    Asthma     Follows with PCP    Bipolar 1 disorder (HCC)     Depression     Miscarriage     Obesity      premature rupture of membranes (PPROM) delivered, current hospitalization 2014     uterine contractions, antepartum 2014    Wears glasses        Past Surgical History:   Procedure Laterality Date    ANKLE FRACTURE SURGERY Left 2020    LEFT ANKLE EXTERNAL FIXATION performed by Praveena Means MD at [facility] OR    ANKLE FRACTURE SURGERY Left 2020    LEFT ANKLE OPEN REDUCTION INTERNAL FIXATION performed by Praveena Means MD at [facility] OR    ANKLE SURGERY Left 2020    LEFT ANKLE EXTERNAL FIXATOR REMOVAL performed by Praveena Means MD at [facility] OR       Social History     Tobacco Use    Smoking status: Former     Current packs/day: 0.25     Average packs/day: 0.3 packs/day for 16.0 years (4.0 ttl pk-yrs)     Types: Cigarettes     Start date:     Smokeless tobacco: Never   Vaping Use    Vaping Use: Never used   Substance Use Topics    Alcohol use: Yes     Comment: occ    Drug use: Yes     Types: Marijuana (Weed)       Family History   Problem Relation Age of Onset    Diabetes Paternal

## 2024-01-13 LAB
C TRACH RRNA SPEC QL NAA+PROBE: NEGATIVE
N GONORRHOEA RRNA SPEC QL NAA+PROBE: NEGATIVE

## 2024-01-14 LAB
HPV HIGH RISK: NOT DETECTED
HPV, GENOTYPE 16: NOT DETECTED
HPV, GENOTYPE 18: NOT DETECTED

## 2024-02-01 ENCOUNTER — OFFICE VISIT (OUTPATIENT)
Dept: OBGYN | Age: 29
End: 2024-02-01
Payer: COMMERCIAL

## 2024-02-01 VITALS
WEIGHT: 246 LBS | BODY MASS INDEX: 39.53 KG/M2 | DIASTOLIC BLOOD PRESSURE: 80 MMHG | HEIGHT: 66 IN | SYSTOLIC BLOOD PRESSURE: 123 MMHG

## 2024-02-01 DIAGNOSIS — N92.1 MENORRHAGIA WITH IRREGULAR CYCLE: ICD-10-CM

## 2024-02-01 DIAGNOSIS — N94.6 DYSMENORRHEA: Primary | ICD-10-CM

## 2024-02-01 PROCEDURE — G8417 CALC BMI ABV UP PARAM F/U: HCPCS | Performed by: OBSTETRICS & GYNECOLOGY

## 2024-02-01 PROCEDURE — 1036F TOBACCO NON-USER: CPT | Performed by: OBSTETRICS & GYNECOLOGY

## 2024-02-01 PROCEDURE — G8484 FLU IMMUNIZE NO ADMIN: HCPCS | Performed by: OBSTETRICS & GYNECOLOGY

## 2024-02-01 PROCEDURE — 99213 OFFICE O/P EST LOW 20 MIN: CPT | Performed by: OBSTETRICS & GYNECOLOGY

## 2024-02-01 PROCEDURE — G8427 DOCREV CUR MEDS BY ELIG CLIN: HCPCS | Performed by: OBSTETRICS & GYNECOLOGY

## 2024-02-01 NOTE — PROGRESS NOTES
types on file for this patient.    Review of Systems  All other systems reviewed and are negative    /80   Ht 1.676 m (5' 6\")   Wt 111.6 kg (246 lb)   LMP 01/30/2024 (Exact Date)   BMI 39.71 kg/m²     Physical Exam    No results found for this visit on 02/01/24.    ASSESSMENT AND PLAN   Diagnosis Orders   1. Dysmenorrhea        2. Menorrhagia with irregular cycle        CW paragard  Chaperone Prema Bennett was present for the entire appointment.    Data Unavailable     No follow-ups on file.    Christian Bhandari MD

## (undated) DEVICE — BIT DRL L110MM DIA2.5MM G QUIK CPL W/O STP REUSE

## (undated) DEVICE — PAD,ABDOMINAL,5"X9",ST,LF,25/BX: Brand: MEDLINE INDUSTRIES, INC.

## (undated) DEVICE — SPLINT ORTH W5XL30IN PLSTR OF PARIS LO EXOTHERM SMOOTH

## (undated) DEVICE — BANDAGE,GAUZE,BULKEE II,4.5"X4.1YD,STRL: Brand: MEDLINE

## (undated) DEVICE — COVER,C-ARM,41X74: Brand: MEDLINE

## (undated) DEVICE — SPONGE GZ W4XL8IN COT WVN 12 PLY

## (undated) DEVICE — TOWEL,OR,DSP,ST,BLUE,STD,6/PK,12PK/CS: Brand: MEDLINE

## (undated) DEVICE — SUTURE ETHLN SZ 3-0 L30IN NONABSORBABLE BLK FS-1 L24MM 3/8 669H

## (undated) DEVICE — BANDAGE COMPR M W6INXL10YD WHT BGE VELC E MTRX HK AND LOOP

## (undated) DEVICE — DRAPE SHEET ULTRAGARD: Brand: MEDLINE

## (undated) DEVICE — GLOVE SURG SZ 65 CRM LTX FREE POLYISOPRENE POLYMER BEAD ANTI

## (undated) DEVICE — PENCIL ES CRD L10FT HND SWCHING ROCK SWCH W/ EDGE COAT BLDE

## (undated) DEVICE — COUNTER NDL 30 COUNT FOAM STRP SGL MAG

## (undated) DEVICE — BIT DRL L125MM DIA2.7MM QUIK CPL 3 FLUT

## (undated) DEVICE — DRESSING,GAUZE,XEROFORM,CURAD,1"X8",ST: Brand: CURAD

## (undated) DEVICE — BANDAGE,SELF ADHRNT,COFLEX,4"X5YD,STRL: Brand: COLABEL

## (undated) DEVICE — DRAPE,U/ SHT,SPLIT,PLAS,STERIL: Brand: MEDLINE

## (undated) DEVICE — PADDING CAST W6INXL4YD COT LO LINTING WYTEX

## (undated) DEVICE — DRESSING,GAUZE,XEROFORM,CURAD,5"X9",ST: Brand: CURAD

## (undated) DEVICE — ZIMMER® STERILE DISPOSABLE TOURNIQUET CUFF WITH PLC, DUAL PORT, SINGLE BLADDER, 34 IN. (86 CM)

## (undated) DEVICE — GLOVE SURG SZ 65 THK91MIL LTX FREE SYN POLYISOPRENE

## (undated) DEVICE — CLAMP EXT FIX L PIN 6 POS MAG RESONANCE CONDITIONAL

## (undated) DEVICE — MARKER SURG SKIN UTIL REGULAR/FINE 2 TIP W/ RUL AND 9 LBL

## (undated) DEVICE — Device

## (undated) DEVICE — COTTON UNDERCAST PADDING,CRIMPED FINISH: Brand: WEBRIL

## (undated) DEVICE — BIT DRL L160MM DIA2.7MM CANN QUIK CPL ADJ STP REUSE FOR

## (undated) DEVICE — PADDING,UNDERCAST,COTTON, 4"X4YD STERILE: Brand: MEDLINE

## (undated) DEVICE — TRAY PREP DRY W/ PREM GLV 2 APPL 6 SPNG 2 UNDPD 1 OVERWRAP

## (undated) DEVICE — INTENDED FOR TISSUE SEPARATION, AND OTHER PROCEDURES THAT REQUIRE A SHARP SURGICAL BLADE TO PUNCTURE OR CUT.: Brand: BARD-PARKER ® STAINLESS STEEL BLADES

## (undated) DEVICE — APPLICATOR MEDICATED 26 CC SOLUTION HI LT ORNG CHLORAPREP

## (undated) DEVICE — SPONGE LAP W18XL18IN WHT COT 4 PLY FLD STRUNG RADPQ DISP ST

## (undated) DEVICE — C-ARMOR C-ARM EQUIPMENT COVERS CLEAR STERILE UNIVERSAL FIT 12 PER CASE: Brand: C-ARMOR

## (undated) DEVICE — GAUZE,SPONGE,4"X4",16PLY,XRAY,STRL,LF: Brand: MEDLINE

## (undated) DEVICE — ELECTRODE ES AD CRDLSS PT RET REM POLYHESIVE

## (undated) DEVICE — 3M™ STERI-DRAPE™ U-DRAPE 1015: Brand: STERI-DRAPE™

## (undated) DEVICE — TUBING, SUCTION, 9/32" X 10', STRAIGHT: Brand: MEDLINE

## (undated) DEVICE — APPLICATOR MEDICATED L4IN PVP IOD SAT PREP SOL PD SWABSTK

## (undated) DEVICE — YANKAUER,FLEXIBLE HANDLE,REGLR CAPACITY: Brand: MEDLINE INDUSTRIES, INC.

## (undated) DEVICE — POST EXT FIX DIA11MM 30DEG OUTRIG MAG RESONANCE CONDITIONAL

## (undated) DEVICE — SYRINGE IRRIG 60ML SFT PLIABLE BLB EZ TO GRP 1 HND USE W/

## (undated) DEVICE — BIT DRL L110MM DIA2MM QUIK CONN W/O STP N RADLUC REUSE

## (undated) DEVICE — ROD EXT FIX L250MM DIA11MM C FBR MR CONDITIONAL

## (undated) DEVICE — BIT DRL L140MM DIA2MM QUIK CPL 3 FLUT CALIB DEPTH MRK W/O

## (undated) DEVICE — CLAMP EXT FIX L TI ALLY COMB CLP ON SELF HLD

## (undated) DEVICE — TUBING SUCT 12FR MAL ALUM SHFT FN CAP VENT UNIV CONN W/ OBT

## (undated) DEVICE — BIT DRL L195MM DIA3.5MM QUIK CPL FOR PELV INSTR SET PRO-PAK

## (undated) DEVICE — ZIMMER® STERILE DISPOSABLE TOURNIQUET CUFF WITH PLC, DUAL PORT, SINGLE BLADDER, 30 IN. (76 CM)